# Patient Record
Sex: MALE | Race: BLACK OR AFRICAN AMERICAN | NOT HISPANIC OR LATINO | Employment: UNEMPLOYED | URBAN - METROPOLITAN AREA
[De-identification: names, ages, dates, MRNs, and addresses within clinical notes are randomized per-mention and may not be internally consistent; named-entity substitution may affect disease eponyms.]

---

## 2022-02-18 ENCOUNTER — HOSPITAL ENCOUNTER (OUTPATIENT)
Facility: HOSPITAL | Age: 67
Discharge: HOME | End: 2022-02-20
Attending: EMERGENCY MEDICINE | Admitting: HOSPITALIST
Payer: MEDICARE

## 2022-02-18 DIAGNOSIS — R60.0 EDEMA OF BOTH LOWER EXTREMITIES: ICD-10-CM

## 2022-02-18 DIAGNOSIS — R50.9 FEVER, UNKNOWN ORIGIN: ICD-10-CM

## 2022-02-18 DIAGNOSIS — L97.909 VENOUS STASIS ULCER, UNSPECIFIED SITE, UNSPECIFIED ULCER STAGE, UNSPECIFIED WHETHER VARICOSE VEINS PRESENT: ICD-10-CM

## 2022-02-18 DIAGNOSIS — I83.009 VENOUS STASIS ULCER, UNSPECIFIED SITE, UNSPECIFIED ULCER STAGE, UNSPECIFIED WHETHER VARICOSE VEINS PRESENT: ICD-10-CM

## 2022-02-18 DIAGNOSIS — R60.9 SWELLING: Primary | ICD-10-CM

## 2022-02-18 LAB
ALBUMIN SERPL BCP-MCNC: 2.7 G/DL (ref 3.5–5.2)
ALP SERPL-CCNC: 136 U/L (ref 55–135)
ALT SERPL W/O P-5'-P-CCNC: 18 U/L (ref 10–44)
ANION GAP SERPL CALC-SCNC: 6 MMOL/L (ref 8–16)
AST SERPL-CCNC: 13 U/L (ref 10–40)
BASOPHILS # BLD AUTO: 0.01 K/UL (ref 0–0.2)
BASOPHILS NFR BLD: 0.1 % (ref 0–1.9)
BILIRUB SERPL-MCNC: 0.4 MG/DL (ref 0.1–1)
BILIRUB UR QL STRIP: NEGATIVE
BNP SERPL-MCNC: 26 PG/ML (ref 0–99)
BUN SERPL-MCNC: 19 MG/DL (ref 8–23)
CALCIUM SERPL-MCNC: 8.9 MG/DL (ref 8.7–10.5)
CHLORIDE SERPL-SCNC: 107 MMOL/L (ref 95–110)
CLARITY UR: CLEAR
CO2 SERPL-SCNC: 26 MMOL/L (ref 23–29)
COLOR UR: COLORLESS
CREAT SERPL-MCNC: 1.2 MG/DL (ref 0.5–1.4)
CTP QC/QA: YES
CTP QC/QA: YES
DIFFERENTIAL METHOD: ABNORMAL
EOSINOPHIL # BLD AUTO: 0.2 K/UL (ref 0–0.5)
EOSINOPHIL NFR BLD: 1.8 % (ref 0–8)
ERYTHROCYTE [DISTWIDTH] IN BLOOD BY AUTOMATED COUNT: 12.8 % (ref 11.5–14.5)
EST. GFR  (AFRICAN AMERICAN): >60 ML/MIN/1.73 M^2
EST. GFR  (NON AFRICAN AMERICAN): >60 ML/MIN/1.73 M^2
GLUCOSE SERPL-MCNC: 190 MG/DL (ref 70–110)
GLUCOSE UR QL STRIP: NEGATIVE
HCT VFR BLD AUTO: 32.5 % (ref 40–54)
HGB BLD-MCNC: 10.4 G/DL (ref 14–18)
HGB UR QL STRIP: NEGATIVE
IMM GRANULOCYTES # BLD AUTO: 0.04 K/UL (ref 0–0.04)
IMM GRANULOCYTES NFR BLD AUTO: 0.4 % (ref 0–0.5)
KETONES UR QL STRIP: NEGATIVE
LEUKOCYTE ESTERASE UR QL STRIP: NEGATIVE
LYMPHOCYTES # BLD AUTO: 1.7 K/UL (ref 1–4.8)
LYMPHOCYTES NFR BLD: 18.5 % (ref 18–48)
MCH RBC QN AUTO: 28.2 PG (ref 27–31)
MCHC RBC AUTO-ENTMCNC: 32 G/DL (ref 32–36)
MCV RBC AUTO: 88 FL (ref 82–98)
MONOCYTES # BLD AUTO: 0.6 K/UL (ref 0.3–1)
MONOCYTES NFR BLD: 6.4 % (ref 4–15)
NEUTROPHILS # BLD AUTO: 6.9 K/UL (ref 1.8–7.7)
NEUTROPHILS NFR BLD: 72.8 % (ref 38–73)
NITRITE UR QL STRIP: NEGATIVE
NRBC BLD-RTO: 0 /100 WBC
PH UR STRIP: 7 [PH] (ref 5–8)
PLATELET # BLD AUTO: 373 K/UL (ref 150–450)
PMV BLD AUTO: 10.4 FL (ref 9.2–12.9)
POC MOLECULAR INFLUENZA A AGN: NEGATIVE
POC MOLECULAR INFLUENZA B AGN: NEGATIVE
POTASSIUM SERPL-SCNC: 4.1 MMOL/L (ref 3.5–5.1)
PROT SERPL-MCNC: 6.5 G/DL (ref 6–8.4)
PROT UR QL STRIP: NEGATIVE
RBC # BLD AUTO: 3.69 M/UL (ref 4.6–6.2)
SARS-COV-2 RDRP RESP QL NAA+PROBE: NEGATIVE
SODIUM SERPL-SCNC: 139 MMOL/L (ref 136–145)
SP GR UR STRIP: 1.01 (ref 1–1.03)
TROPONIN I SERPL DL<=0.01 NG/ML-MCNC: <0.006 NG/ML (ref 0–0.03)
TSH SERPL DL<=0.005 MIU/L-ACNC: 1.22 UIU/ML (ref 0.4–4)
URN SPEC COLLECT METH UR: ABNORMAL
UROBILINOGEN UR STRIP-ACNC: NEGATIVE EU/DL
WBC # BLD AUTO: 9.43 K/UL (ref 3.9–12.7)

## 2022-02-18 PROCEDURE — 84443 ASSAY THYROID STIM HORMONE: CPT | Performed by: EMERGENCY MEDICINE

## 2022-02-18 PROCEDURE — 81003 URINALYSIS AUTO W/O SCOPE: CPT | Performed by: EMERGENCY MEDICINE

## 2022-02-18 PROCEDURE — 85025 COMPLETE CBC W/AUTO DIFF WBC: CPT | Performed by: EMERGENCY MEDICINE

## 2022-02-18 PROCEDURE — 93010 ELECTROCARDIOGRAM REPORT: CPT | Mod: ,,, | Performed by: INTERNAL MEDICINE

## 2022-02-18 PROCEDURE — 96375 TX/PRO/DX INJ NEW DRUG ADDON: CPT

## 2022-02-18 PROCEDURE — 84484 ASSAY OF TROPONIN QUANT: CPT | Performed by: EMERGENCY MEDICINE

## 2022-02-18 PROCEDURE — 63600175 PHARM REV CODE 636 W HCPCS: Performed by: EMERGENCY MEDICINE

## 2022-02-18 PROCEDURE — 83880 ASSAY OF NATRIURETIC PEPTIDE: CPT | Performed by: EMERGENCY MEDICINE

## 2022-02-18 PROCEDURE — U0002 COVID-19 LAB TEST NON-CDC: HCPCS | Performed by: EMERGENCY MEDICINE

## 2022-02-18 PROCEDURE — 87040 BLOOD CULTURE FOR BACTERIA: CPT | Performed by: EMERGENCY MEDICINE

## 2022-02-18 PROCEDURE — 93005 ELECTROCARDIOGRAM TRACING: CPT

## 2022-02-18 PROCEDURE — 96365 THER/PROPH/DIAG IV INF INIT: CPT | Mod: 59

## 2022-02-18 PROCEDURE — 93010 EKG 12-LEAD: ICD-10-PCS | Mod: ,,, | Performed by: INTERNAL MEDICINE

## 2022-02-18 PROCEDURE — 80053 COMPREHEN METABOLIC PANEL: CPT | Performed by: EMERGENCY MEDICINE

## 2022-02-18 PROCEDURE — 99285 EMERGENCY DEPT VISIT HI MDM: CPT | Mod: 25

## 2022-02-18 RX ORDER — HYDRALAZINE HYDROCHLORIDE 20 MG/ML
10 INJECTION INTRAMUSCULAR; INTRAVENOUS
Status: COMPLETED | OUTPATIENT
Start: 2022-02-18 | End: 2022-02-18

## 2022-02-18 RX ORDER — MUPIROCIN 20 MG/G
1 OINTMENT TOPICAL
Status: COMPLETED | OUTPATIENT
Start: 2022-02-18 | End: 2022-02-19

## 2022-02-18 RX ORDER — IBUPROFEN 600 MG/1
600 TABLET ORAL
Status: COMPLETED | OUTPATIENT
Start: 2022-02-18 | End: 2022-02-19

## 2022-02-18 RX ORDER — ACETAMINOPHEN 325 MG/1
650 TABLET ORAL
Status: COMPLETED | OUTPATIENT
Start: 2022-02-18 | End: 2022-02-19

## 2022-02-18 RX ORDER — FUROSEMIDE 10 MG/ML
80 INJECTION INTRAMUSCULAR; INTRAVENOUS
Status: COMPLETED | OUTPATIENT
Start: 2022-02-18 | End: 2022-02-18

## 2022-02-18 RX ADMIN — HYDRALAZINE HYDROCHLORIDE 10 MG: 20 INJECTION, SOLUTION INTRAMUSCULAR; INTRAVENOUS at 10:02

## 2022-02-18 RX ADMIN — FUROSEMIDE 80 MG: 40 INJECTION, SOLUTION INTRAMUSCULAR; INTRAVENOUS at 09:02

## 2022-02-19 ENCOUNTER — HOSPITAL ENCOUNTER (OUTPATIENT)
Dept: RADIOLOGY | Facility: HOSPITAL | Age: 67
Discharge: HOME | End: 2022-02-19
Attending: HOSPITALIST
Payer: MEDICARE

## 2022-02-19 PROBLEM — R50.9 FEVER: Status: ACTIVE | Noted: 2022-02-19

## 2022-02-19 PROBLEM — T07.XXXA WOUNDS, MULTIPLE: Status: ACTIVE | Noted: 2022-02-19

## 2022-02-19 PROBLEM — R60.0 EDEMA OF BOTH LOWER EXTREMITIES: Status: ACTIVE | Noted: 2022-02-19

## 2022-02-19 PROBLEM — I10 HTN (HYPERTENSION): Status: ACTIVE | Noted: 2022-02-19

## 2022-02-19 PROBLEM — G93.41 ENCEPHALOPATHY, METABOLIC: Status: ACTIVE | Noted: 2022-02-19

## 2022-02-19 PROBLEM — R53.81 DEBILITY: Status: ACTIVE | Noted: 2022-02-19

## 2022-02-19 PROBLEM — D64.9 ANEMIA: Status: ACTIVE | Noted: 2022-02-19

## 2022-02-19 PROBLEM — E11.59 TYPE 2 DIABETES MELLITUS WITH CIRCULATORY DISORDER: Status: ACTIVE | Noted: 2022-02-19

## 2022-02-19 PROBLEM — Z86.73 HISTORY OF STROKE: Status: ACTIVE | Noted: 2022-02-19

## 2022-02-19 LAB
AMMONIA PLAS-SCNC: 35 UMOL/L (ref 10–50)
AMPHET+METHAMPHET UR QL: NEGATIVE
ANION GAP SERPL CALC-SCNC: 6 MMOL/L (ref 8–16)
AORTIC ROOT ANNULUS: 3.06 CM
AORTIC VALVE CUSP SEPERATION: 2.31 CM
AV INDEX (PROSTH): 0.76
AV MEAN GRADIENT: 6 MMHG
AV PEAK GRADIENT: 12 MMHG
AV VALVE AREA: 2.16 CM2
AV VELOCITY RATIO: 0.88
BARBITURATES UR QL SCN>200 NG/ML: NEGATIVE
BASOPHILS # BLD AUTO: 0.02 K/UL (ref 0–0.2)
BASOPHILS NFR BLD: 0.2 % (ref 0–1.9)
BENZODIAZ UR QL SCN>200 NG/ML: NEGATIVE
BSA FOR ECHO PROCEDURE: 1.92 M2
BUN SERPL-MCNC: 18 MG/DL (ref 8–23)
BZE UR QL SCN: NEGATIVE
CALCIUM SERPL-MCNC: 8.4 MG/DL (ref 8.7–10.5)
CANNABINOIDS UR QL SCN: NEGATIVE
CHLORIDE SERPL-SCNC: 105 MMOL/L (ref 95–110)
CHOLEST SERPL-MCNC: 90 MG/DL (ref 120–199)
CHOLEST/HDLC SERPL: 2.5 {RATIO} (ref 2–5)
CO2 SERPL-SCNC: 26 MMOL/L (ref 23–29)
CREAT SERPL-MCNC: 1.1 MG/DL (ref 0.5–1.4)
CREAT UR-MCNC: 31.9 MG/DL (ref 23–375)
CREAT UR-MCNC: 31.9 MG/DL (ref 23–375)
CV ECHO LV RWT: 0.39 CM
DIFFERENTIAL METHOD: ABNORMAL
DOP CALC AO PEAK VEL: 1.71 M/S
DOP CALC AO VTI: 37.22 CM
DOP CALC LVOT AREA: 2.8 CM2
DOP CALC LVOT DIAMETER: 1.9 CM
DOP CALC LVOT PEAK VEL: 1.5 M/S
DOP CALC LVOT STROKE VOLUME: 80.48 CM3
DOP CALCLVOT PEAK VEL VTI: 28.4 CM
E WAVE DECELERATION TIME: 282.1 MSEC
E/A RATIO: 0.88
E/E' RATIO: 10 M/S
ECHO LV POSTERIOR WALL: 1.01 CM (ref 0.6–1.1)
EJECTION FRACTION: 65 %
EOSINOPHIL # BLD AUTO: 0.1 K/UL (ref 0–0.5)
EOSINOPHIL NFR BLD: 1.2 % (ref 0–8)
ERYTHROCYTE [DISTWIDTH] IN BLOOD BY AUTOMATED COUNT: 12.7 % (ref 11.5–14.5)
EST. GFR  (AFRICAN AMERICAN): >60 ML/MIN/1.73 M^2
EST. GFR  (NON AFRICAN AMERICAN): >60 ML/MIN/1.73 M^2
ESTIMATED AVG GLUCOSE: 137 MG/DL (ref 68–131)
FERRITIN SERPL-MCNC: 181 NG/ML (ref 20–300)
FRACTIONAL SHORTENING: 39 % (ref 28–44)
GLUCOSE SERPL-MCNC: 153 MG/DL (ref 70–110)
HBA1C MFR BLD: 6.4 % (ref 4–5.6)
HCT VFR BLD AUTO: 28.2 % (ref 40–54)
HDLC SERPL-MCNC: 36 MG/DL (ref 40–75)
HDLC SERPL: 40 % (ref 20–50)
HGB BLD-MCNC: 8.9 G/DL (ref 14–18)
HIV1+2 IGG SERPL QL IA.RAPID: NORMAL
IMM GRANULOCYTES # BLD AUTO: 0.03 K/UL (ref 0–0.04)
IMM GRANULOCYTES NFR BLD AUTO: 0.3 % (ref 0–0.5)
INTERVENTRICULAR SEPTUM: 1.12 CM (ref 0.6–1.1)
IRON SERPL-MCNC: 19 UG/DL (ref 45–160)
IVRT: 87.54 MSEC
LA MAJOR: 4.21 CM
LA MINOR: 4.41 CM
LA WIDTH: 4.07 CM
LACTATE SERPL-SCNC: 1.5 MMOL/L (ref 0.5–2.2)
LDLC SERPL CALC-MCNC: 42.6 MG/DL (ref 63–159)
LEFT ATRIUM SIZE: 3.6 CM
LEFT ATRIUM VOLUME INDEX: 28.4 ML/M2
LEFT ATRIUM VOLUME: 53.65 CM3
LEFT INTERNAL DIMENSION IN SYSTOLE: 3.17 CM (ref 2.1–4)
LEFT VENTRICLE DIASTOLIC VOLUME INDEX: 69.03 ML/M2
LEFT VENTRICLE DIASTOLIC VOLUME: 130.47 ML
LEFT VENTRICLE MASS INDEX: 112 G/M2
LEFT VENTRICLE SYSTOLIC VOLUME INDEX: 21.2 ML/M2
LEFT VENTRICLE SYSTOLIC VOLUME: 40.07 ML
LEFT VENTRICULAR INTERNAL DIMENSION IN DIASTOLE: 5.22 CM (ref 3.5–6)
LEFT VENTRICULAR MASS: 212.62 G
LV LATERAL E/E' RATIO: 9.55 M/S
LV SEPTAL E/E' RATIO: 10.5 M/S
LYMPHOCYTES # BLD AUTO: 1.8 K/UL (ref 1–4.8)
LYMPHOCYTES NFR BLD: 15.9 % (ref 18–48)
MCH RBC QN AUTO: 27.9 PG (ref 27–31)
MCHC RBC AUTO-ENTMCNC: 31.6 G/DL (ref 32–36)
MCV RBC AUTO: 88 FL (ref 82–98)
METHADONE UR QL SCN>300 NG/ML: NEGATIVE
MONOCYTES # BLD AUTO: 0.8 K/UL (ref 0.3–1)
MONOCYTES NFR BLD: 7.6 % (ref 4–15)
MV PEAK A VEL: 1.2 M/S
MV PEAK E VEL: 1.05 M/S
MV STENOSIS PRESSURE HALF TIME: 59.32 MS
MV VALVE AREA P 1/2 METHOD: 3.71 CM2
NEUTROPHILS # BLD AUTO: 8.3 K/UL (ref 1.8–7.7)
NEUTROPHILS NFR BLD: 74.8 % (ref 38–73)
NONHDLC SERPL-MCNC: 54 MG/DL
NRBC BLD-RTO: 0 /100 WBC
OPIATES UR QL SCN: NEGATIVE
PCP UR QL SCN>25 NG/ML: NEGATIVE
PISA TR MAX VEL: 1.79 M/S
PLATELET # BLD AUTO: 324 K/UL (ref 150–450)
PMV BLD AUTO: 10.8 FL (ref 9.2–12.9)
POCT GLUCOSE: 142 MG/DL (ref 70–110)
POCT GLUCOSE: 163 MG/DL (ref 70–110)
POCT GLUCOSE: 85 MG/DL (ref 70–110)
POCT GLUCOSE: 98 MG/DL (ref 70–110)
POTASSIUM SERPL-SCNC: 3.5 MMOL/L (ref 3.5–5.1)
PROCALCITONIN SERPL IA-MCNC: 0.05 NG/ML
PROT UR-MCNC: <7 MG/DL
PROT/CREAT UR: NORMAL MG/G{CREAT} (ref 0–0.2)
RA MAJOR: 4 CM
RA PRESSURE: 3 MMHG
RA WIDTH: 3.39 CM
RBC # BLD AUTO: 3.19 M/UL (ref 4.6–6.2)
RIGHT VENTRICULAR END-DIASTOLIC DIMENSION: 3.33 CM
RV TISSUE DOPPLER FREE WALL SYSTOLIC VELOCITY 1 (APICAL 4 CHAMBER VIEW): 16.39 CM/S
SATURATED IRON: 9 % (ref 20–50)
SODIUM SERPL-SCNC: 137 MMOL/L (ref 136–145)
STJ: 2.34 CM
TDI LATERAL: 0.11 M/S
TDI SEPTAL: 0.1 M/S
TDI: 0.11 M/S
TOTAL IRON BINDING CAPACITY: 201 UG/DL (ref 250–450)
TOXICOLOGY INFORMATION: NORMAL
TR MAX PG: 13 MMHG
TRANSFERRIN SERPL-MCNC: 136 MG/DL (ref 200–375)
TRICUSPID ANNULAR PLANE SYSTOLIC EXCURSION: 2.48 CM
TRIGL SERPL-MCNC: 57 MG/DL (ref 30–150)
TV REST PULMONARY ARTERY PRESSURE: 16 MMHG
WBC # BLD AUTO: 11.07 K/UL (ref 3.9–12.7)

## 2022-02-19 PROCEDURE — 70450 CT HEAD/BRAIN W/O DYE: CPT | Mod: TC

## 2022-02-19 PROCEDURE — 25000003 PHARM REV CODE 250: Performed by: EMERGENCY MEDICINE

## 2022-02-19 PROCEDURE — 80307 DRUG TEST PRSMV CHEM ANLYZR: CPT | Performed by: HOSPITALIST

## 2022-02-19 PROCEDURE — 82607 VITAMIN B-12: CPT | Performed by: HOSPITALIST

## 2022-02-19 PROCEDURE — 84145 PROCALCITONIN (PCT): CPT | Performed by: HOSPITALIST

## 2022-02-19 PROCEDURE — 84156 ASSAY OF PROTEIN URINE: CPT | Performed by: HOSPITALIST

## 2022-02-19 PROCEDURE — 25000003 PHARM REV CODE 250: Performed by: HOSPITALIST

## 2022-02-19 PROCEDURE — 83036 HEMOGLOBIN GLYCOSYLATED A1C: CPT | Performed by: NURSE PRACTITIONER

## 2022-02-19 PROCEDURE — 70450 CT HEAD/BRAIN W/O DYE: CPT | Mod: 26,,, | Performed by: RADIOLOGY

## 2022-02-19 PROCEDURE — 96366 THER/PROPH/DIAG IV INF ADDON: CPT

## 2022-02-19 PROCEDURE — 63600175 PHARM REV CODE 636 W HCPCS: Performed by: HOSPITALIST

## 2022-02-19 PROCEDURE — 63600175 PHARM REV CODE 636 W HCPCS: Performed by: NURSE PRACTITIONER

## 2022-02-19 PROCEDURE — 36415 COLL VENOUS BLD VENIPUNCTURE: CPT | Performed by: EMERGENCY MEDICINE

## 2022-02-19 PROCEDURE — 80061 LIPID PANEL: CPT | Performed by: NURSE PRACTITIONER

## 2022-02-19 PROCEDURE — 63600175 PHARM REV CODE 636 W HCPCS: Performed by: EMERGENCY MEDICINE

## 2022-02-19 PROCEDURE — 80074 ACUTE HEPATITIS PANEL: CPT | Performed by: HOSPITALIST

## 2022-02-19 PROCEDURE — 85025 COMPLETE CBC W/AUTO DIFF WBC: CPT | Performed by: NURSE PRACTITIONER

## 2022-02-19 PROCEDURE — 86703 HIV-1/HIV-2 1 RESULT ANTBDY: CPT | Performed by: HOSPITALIST

## 2022-02-19 PROCEDURE — 96376 TX/PRO/DX INJ SAME DRUG ADON: CPT

## 2022-02-19 PROCEDURE — 87040 BLOOD CULTURE FOR BACTERIA: CPT | Performed by: EMERGENCY MEDICINE

## 2022-02-19 PROCEDURE — 86592 SYPHILIS TEST NON-TREP QUAL: CPT | Performed by: HOSPITALIST

## 2022-02-19 PROCEDURE — G0378 HOSPITAL OBSERVATION PER HR: HCPCS

## 2022-02-19 PROCEDURE — 82140 ASSAY OF AMMONIA: CPT | Performed by: HOSPITALIST

## 2022-02-19 PROCEDURE — 84466 ASSAY OF TRANSFERRIN: CPT | Performed by: HOSPITALIST

## 2022-02-19 PROCEDURE — 36415 COLL VENOUS BLD VENIPUNCTURE: CPT | Performed by: HOSPITALIST

## 2022-02-19 PROCEDURE — 83605 ASSAY OF LACTIC ACID: CPT | Performed by: EMERGENCY MEDICINE

## 2022-02-19 PROCEDURE — 70450 CT HEAD WITHOUT CONTRAST: ICD-10-PCS | Mod: 26,,, | Performed by: RADIOLOGY

## 2022-02-19 PROCEDURE — 82728 ASSAY OF FERRITIN: CPT | Performed by: HOSPITALIST

## 2022-02-19 PROCEDURE — 96372 THER/PROPH/DIAG INJ SC/IM: CPT | Mod: 59 | Performed by: NURSE PRACTITIONER

## 2022-02-19 PROCEDURE — 80048 BASIC METABOLIC PNL TOTAL CA: CPT | Performed by: NURSE PRACTITIONER

## 2022-02-19 PROCEDURE — 96365 THER/PROPH/DIAG IV INF INIT: CPT | Mod: 59

## 2022-02-19 PROCEDURE — 25000003 PHARM REV CODE 250: Performed by: NURSE PRACTITIONER

## 2022-02-19 PROCEDURE — 82746 ASSAY OF FOLIC ACID SERUM: CPT | Performed by: HOSPITALIST

## 2022-02-19 PROCEDURE — 82962 GLUCOSE BLOOD TEST: CPT

## 2022-02-19 RX ORDER — LISINOPRIL 20 MG/1
20 TABLET ORAL DAILY
Status: DISCONTINUED | OUTPATIENT
Start: 2022-02-19 | End: 2022-02-21 | Stop reason: HOSPADM

## 2022-02-19 RX ORDER — ENOXAPARIN SODIUM 100 MG/ML
40 INJECTION SUBCUTANEOUS EVERY 24 HOURS
Status: DISCONTINUED | OUTPATIENT
Start: 2022-02-19 | End: 2022-02-21 | Stop reason: HOSPADM

## 2022-02-19 RX ORDER — IBUPROFEN 200 MG
24 TABLET ORAL
Status: DISCONTINUED | OUTPATIENT
Start: 2022-02-19 | End: 2022-02-21 | Stop reason: HOSPADM

## 2022-02-19 RX ORDER — FUROSEMIDE 10 MG/ML
40 INJECTION INTRAMUSCULAR; INTRAVENOUS 2 TIMES DAILY WITH MEALS
Status: DISCONTINUED | OUTPATIENT
Start: 2022-02-19 | End: 2022-02-19

## 2022-02-19 RX ORDER — AMLODIPINE BESYLATE 10 MG/1
10 TABLET ORAL DAILY
Status: ON HOLD | COMMUNITY
End: 2022-02-20 | Stop reason: HOSPADM

## 2022-02-19 RX ORDER — LANOLIN ALCOHOL/MO/W.PET/CERES
1 CREAM (GRAM) TOPICAL DAILY
Status: DISCONTINUED | OUTPATIENT
Start: 2022-02-19 | End: 2022-02-21 | Stop reason: HOSPADM

## 2022-02-19 RX ORDER — LISINOPRIL 40 MG/1
40 TABLET ORAL DAILY
Status: ON HOLD | COMMUNITY
End: 2022-02-20 | Stop reason: HOSPADM

## 2022-02-19 RX ORDER — DOCUSATE SODIUM 100 MG/1
100 CAPSULE, LIQUID FILLED ORAL DAILY
Status: DISCONTINUED | OUTPATIENT
Start: 2022-02-19 | End: 2022-02-21 | Stop reason: HOSPADM

## 2022-02-19 RX ORDER — AMLODIPINE BESYLATE 5 MG/1
10 TABLET ORAL DAILY
Status: DISCONTINUED | OUTPATIENT
Start: 2022-02-19 | End: 2022-02-19

## 2022-02-19 RX ORDER — FUROSEMIDE 40 MG/1
40 TABLET ORAL 2 TIMES DAILY
COMMUNITY

## 2022-02-19 RX ORDER — METOPROLOL TARTRATE 50 MG/1
50 TABLET ORAL 2 TIMES DAILY
Status: ON HOLD | COMMUNITY
End: 2022-02-20 | Stop reason: HOSPADM

## 2022-02-19 RX ORDER — HYDROCHLOROTHIAZIDE 25 MG/1
25 TABLET ORAL DAILY
Status: ON HOLD | COMMUNITY
End: 2022-02-20 | Stop reason: HOSPADM

## 2022-02-19 RX ORDER — GLUCAGON 1 MG
1 KIT INJECTION
Status: DISCONTINUED | OUTPATIENT
Start: 2022-02-19 | End: 2022-02-21 | Stop reason: HOSPADM

## 2022-02-19 RX ORDER — INSULIN ASPART 100 [IU]/ML
0-5 INJECTION, SOLUTION INTRAVENOUS; SUBCUTANEOUS
Status: DISCONTINUED | OUTPATIENT
Start: 2022-02-19 | End: 2022-02-21 | Stop reason: HOSPADM

## 2022-02-19 RX ORDER — HYDRALAZINE HYDROCHLORIDE 20 MG/ML
10 INJECTION INTRAMUSCULAR; INTRAVENOUS EVERY 6 HOURS PRN
Status: DISCONTINUED | OUTPATIENT
Start: 2022-02-19 | End: 2022-02-21 | Stop reason: HOSPADM

## 2022-02-19 RX ORDER — ACETAMINOPHEN 325 MG/1
650 TABLET ORAL EVERY 6 HOURS PRN
Status: DISCONTINUED | OUTPATIENT
Start: 2022-02-19 | End: 2022-02-21 | Stop reason: HOSPADM

## 2022-02-19 RX ORDER — POTASSIUM CHLORIDE 20 MEQ/1
20 TABLET, EXTENDED RELEASE ORAL ONCE
COMMUNITY

## 2022-02-19 RX ORDER — METOPROLOL TARTRATE 25 MG/1
25 TABLET, FILM COATED ORAL 2 TIMES DAILY
Status: DISCONTINUED | OUTPATIENT
Start: 2022-02-19 | End: 2022-02-21 | Stop reason: HOSPADM

## 2022-02-19 RX ORDER — IBUPROFEN 200 MG
16 TABLET ORAL
Status: DISCONTINUED | OUTPATIENT
Start: 2022-02-19 | End: 2022-02-21 | Stop reason: HOSPADM

## 2022-02-19 RX ORDER — FUROSEMIDE 10 MG/ML
40 INJECTION INTRAMUSCULAR; INTRAVENOUS
Status: DISCONTINUED | OUTPATIENT
Start: 2022-02-19 | End: 2022-02-21 | Stop reason: HOSPADM

## 2022-02-19 RX ADMIN — IBUPROFEN 600 MG: 600 TABLET, FILM COATED ORAL at 12:02

## 2022-02-19 RX ADMIN — METOPROLOL TARTRATE 25 MG: 25 TABLET, FILM COATED ORAL at 08:02

## 2022-02-19 RX ADMIN — PIPERACILLIN AND TAZOBACTAM 4.5 G: 4; .5 INJECTION, POWDER, LYOPHILIZED, FOR SOLUTION INTRAVENOUS; PARENTERAL at 12:02

## 2022-02-19 RX ADMIN — PIPERACILLIN AND TAZOBACTAM 4.5 G: 4; .5 INJECTION, POWDER, LYOPHILIZED, FOR SOLUTION INTRAVENOUS; PARENTERAL at 05:02

## 2022-02-19 RX ADMIN — FUROSEMIDE 40 MG: 10 INJECTION, SOLUTION INTRAMUSCULAR; INTRAVENOUS at 07:02

## 2022-02-19 RX ADMIN — FUROSEMIDE 40 MG: 10 INJECTION, SOLUTION INTRAMUSCULAR; INTRAVENOUS at 04:02

## 2022-02-19 RX ADMIN — LISINOPRIL 20 MG: 20 TABLET ORAL at 11:02

## 2022-02-19 RX ADMIN — ACETAMINOPHEN 650 MG: 325 TABLET ORAL at 12:02

## 2022-02-19 RX ADMIN — VANCOMYCIN HYDROCHLORIDE 2000 MG: 10 INJECTION, POWDER, LYOPHILIZED, FOR SOLUTION INTRAVENOUS at 12:02

## 2022-02-19 RX ADMIN — MUPIROCIN 22 G: 20 OINTMENT TOPICAL at 02:02

## 2022-02-19 RX ADMIN — PIPERACILLIN AND TAZOBACTAM 4.5 G: 4; .5 INJECTION, POWDER, LYOPHILIZED, FOR SOLUTION INTRAVENOUS; PARENTERAL at 11:02

## 2022-02-19 RX ADMIN — VANCOMYCIN HYDROCHLORIDE 2250 MG: 1 INJECTION, POWDER, LYOPHILIZED, FOR SOLUTION INTRAVENOUS at 11:02

## 2022-02-19 RX ADMIN — FERROUS SULFATE TAB 325 MG (65 MG ELEMENTAL FE) 1 EACH: 325 (65 FE) TAB at 11:02

## 2022-02-19 RX ADMIN — DOCUSATE SODIUM 100 MG: 100 CAPSULE ORAL at 11:02

## 2022-02-19 RX ADMIN — ENOXAPARIN SODIUM 40 MG: 100 INJECTION SUBCUTANEOUS at 05:02

## 2022-02-19 NOTE — ED TRIAGE NOTES
"Pt a&ox3, calm and cooperative, brought in ems with niece. Presents to ED with multiple stages ulcers on b/l legs and feet with lower peripheral edema. Pt and niece are poor historian. As per both pt and niece, pt wife has been ill and is unable to care for pt. Pt niece states that pt was taken to hospital for current condition in [place] however, due to pt insurance policy the state did not attend to patients needs. When ask where and who is currently taking care of pt, response was "[place]". Pt denies fever/chills, chest pain, sob. Respirations tachypnic, unlabored, mild rigors noted, sinus on cardiac monitor.    "

## 2022-02-19 NOTE — H&P
"[facility] Medicine  History & Physical    Patient Name: Philomena Richards  MRN: [id number]  Patient Class: OP- Observation  Admission Date: 2/18/2022  Attending Physician: Medhat Arreguin MD   Primary Care Provider: No primary care provider on file.         Patient information was obtained from patient, relative(s) and ER records.     Subjective:     Principal Problem:Wounds, multiple    Chief Complaint:   Chief Complaint   Patient presents with    Sores     Pt brought in by family stating that pt has been in a bad living situation and he needs medical care; pt has multiple sores/wounds to bilateral lower legs & family reports hx of DM; pt tachyepnic, has audible wet breath sounds but SpO2 is 100% RA and denies SOB; pt states he "feels fine" and denies any pain; pt in wheelchair but reports he is able to ambulate        HPI: 66 year old male brought to the ED due to sores on bilateral legs. Patient has no medical history per family. Family brings patient in stating that he stays with their family member in [facility] however because he has [place] insurance he had to come back here to get medical care.  Per patient's family member he has had sores on his legs for unknown period of time, and presents here for evaluation.          No past medical history on file.    No past surgical history on file.    Review of patient's allergies indicates:  No Known Allergies    No current facility-administered medications on file prior to encounter.     No current outpatient medications on file prior to encounter.     Family History    None       Tobacco Use    Smoking status: Not on file    Smokeless tobacco: Not on file   Substance and Sexual Activity    Alcohol use: Not on file    Drug use: Not on file    Sexual activity: Not on file     Review of Systems   Constitutional: Negative for fever.   HENT: Negative for sore throat.    Respiratory: Negative for shortness of breath.    Cardiovascular: " Negative for chest pain.   Gastrointestinal: Negative for nausea.   Genitourinary: Negative for dysuria.   Musculoskeletal: Negative for back pain.   Skin: Positive for wound. Negative for rash.   Neurological: Negative for weakness.   Hematological: Does not bruise/bleed easily.   All other systems reviewed and are negative.  Objective:     Vital Signs (Most Recent):  Temp: (!) 100.8 °F (38.2 °C) (02/18/22 2324)  Pulse: 96 (02/18/22 2302)  Resp: (!) 24 (02/18/22 2103)  BP: (!) 186/74 (02/18/22 2302)  SpO2: 99 % (02/18/22 2302) Vital Signs (24h Range):  Temp:  [97.7 °F (36.5 °C)-100.8 °F (38.2 °C)] 100.8 °F (38.2 °C)  Pulse:  [90-96] 96  Resp:  [24] 24  SpO2:  [99 %-100 %] 99 %  BP: (166-209)/() 186/74     Weight: 86.2 kg (190 lb)  Body mass index is 31.62 kg/m².    Physical Exam   Nursing note and vitals reviewed.  Constitutional: He appears well-developed and well-nourished.   HENT:   Head: Normocephalic and atraumatic.   Eyes: EOM are normal. Pupils are equal, round, and reactive to light.   Cardiovascular: Normal rate, regular rhythm and normal heart sounds.   Pulmonary/Chest: Effort normal and breath sounds normal. No respiratory distress.   Abdominal: He exhibits no distension.   Musculoskeletal:         General: No tenderness or edema.     Neurological: He is alert and oriented to person, place, and time.   Skin: Skin is warm and dry.   Psychiatric: He has a normal mood and affect.      4+ b/l LE pitting edema with innumerable venous stasis wounds which have blistered and popped.   None appear infected.    Significant Labs: All pertinent labs within the past 24 hours have been reviewed.    Significant Imaging: I have reviewed all pertinent imaging results/findings within the past 24 hours.    Assessment/Plan:     * Wounds, multiple  Consult wound care  Started on vancomycin and zosyn       HTN (hypertension)  Started patient on lisinopril 20 mg po daily    hydralazine prn      Anemia  H/H stable at  10.4/32.5  Started on iron supplement      Edema of both lower extremities  US of bilateral lower extremities   Echo in am  BNP 26  IV lasix ordered   Strict I&Os   Daily weight        Fever  Likely source of infection bilateral leg wound  Collect Blood cultures and lactic acid   Tylenol prn  Patient started on IV abx           VTE Risk Mitigation (From admission, onward)         Ordered     enoxaparin injection 40 mg  Daily         02/19/22 0229              As clarification, on 2/19/22 @0030, patient should be placed in hospital observation services under my care in collaboration with MD Rl Acosta, NP  Department of Hospital Medicine   [facility] - Emergency Dept

## 2022-02-19 NOTE — PLAN OF CARE
[facility] - Telemetry  Discharge Assessment    Primary Care Provider: Primary Doctor No     Discharge Assessment (most recent)       BRIEF DISCHARGE ASSESSMENT - 02/19/22 1053          Discharge Planning    Assessment Type Discharge Planning Brief Assessment     Resource/Environmental Concerns none     Support Systems Children;Family members     Equipment Currently Used at Home cane, straight     Current Living Arrangements home/apartment/condo     Patient/Family Anticipates Transition to home with family     Patient/Family Anticipated Services at Transition none     Discharge Plan A Home with family     Discharge Plan B Home with family                   SW spoke with patient's daughter to complete brief assessment. Patient's daughter stated patient lives with family. He uses a cane at home, and he does not have HH or receive dialysis. Patient's daughter stated family will provide transportation at discharge.

## 2022-02-19 NOTE — SUBJECTIVE & OBJECTIVE
No past medical history on file.    No past surgical history on file.    Review of patient's allergies indicates:  No Known Allergies    No current facility-administered medications on file prior to encounter.     No current outpatient medications on file prior to encounter.     Family History    None       Tobacco Use    Smoking status: Not on file    Smokeless tobacco: Not on file   Substance and Sexual Activity    Alcohol use: Not on file    Drug use: Not on file    Sexual activity: Not on file     Review of Systems   Constitutional: Negative for fever.   HENT: Negative for sore throat.    Respiratory: Negative for shortness of breath.    Cardiovascular: Negative for chest pain.   Gastrointestinal: Negative for nausea.   Genitourinary: Negative for dysuria.   Musculoskeletal: Negative for back pain.   Skin: Positive for wound. Negative for rash.   Neurological: Negative for weakness.   Hematological: Does not bruise/bleed easily.   All other systems reviewed and are negative.  Objective:     Vital Signs (Most Recent):  Temp: (!) 100.8 °F (38.2 °C) (02/18/22 2324)  Pulse: 96 (02/18/22 2302)  Resp: (!) 24 (02/18/22 2103)  BP: (!) 186/74 (02/18/22 2302)  SpO2: 99 % (02/18/22 2302) Vital Signs (24h Range):  Temp:  [97.7 °F (36.5 °C)-100.8 °F (38.2 °C)] 100.8 °F (38.2 °C)  Pulse:  [90-96] 96  Resp:  [24] 24  SpO2:  [99 %-100 %] 99 %  BP: (166-209)/() 186/74     Weight: 86.2 kg (190 lb)  Body mass index is 31.62 kg/m².    Physical Exam   Nursing note and vitals reviewed.  Constitutional: He appears well-developed and well-nourished.   HENT:   Head: Normocephalic and atraumatic.   Eyes: EOM are normal. Pupils are equal, round, and reactive to light.   Cardiovascular: Normal rate, regular rhythm and normal heart sounds.   Pulmonary/Chest: Effort normal and breath sounds normal. No respiratory distress.   Abdominal: He exhibits no distension.   Musculoskeletal:         General: No tenderness or edema.      Neurological: He is alert and oriented to person, place, and time.   Skin: Skin is warm and dry.   Psychiatric: He has a normal mood and affect.      4+ b/l LE pitting edema with innumerable venous stasis wounds which have blistered and popped.   None appear infected.    Significant Labs: All pertinent labs within the past 24 hours have been reviewed.    Significant Imaging: I have reviewed all pertinent imaging results/findings within the past 24 hours.

## 2022-02-19 NOTE — NURSING
Arrived to Unit by transport. AAO X 3, disoriented to situation. Telemetry monitor put in place and VS assessed, all WNL. Blood sugar 163. Wounds to bilatreal lower ext assessed and dressed per order. Off loading boots in place and foam protecting dressing applied to sacral area. Side rail up X 3 and bed alarm in place. Urinal and call light at side.

## 2022-02-19 NOTE — PROGRESS NOTES
[facility] Medicine  Progress Note    Patient Name: Philomena Richards  MRN: [id number]  Patient Class: OP- Observation   Admission Date: 2/18/2022  Length of Stay: 0 days  Attending Physician: Medhat Arreguin MD  Primary Care Provider: Primary Doctor No        Subjective:     Principal Problem:Wounds, multiple        HPI:  66 year old male brought to the ED due to sores on bilateral legs. Patient has no medical history per family. Family brings patient in stating that he stays with their family member in [facility] however because he has [place] insurance he had to come back here to get medical care.  Per patient's family member he has had sores on his legs for unknown period of time, and presents here for evaluation.          Overview/Hospital Course:  No notes on file    Interval History: No acute events overnight.  Patient minimally verbal.  Denied pain and sob but could not provide any other history.  Spoke with his niece Shahida who provided history.  She states he had a stroke around June 2021 in [place], [place].  She states this is his normal mental status.  States that he walks with a cane.  They brought him in for evaluation of leg edema and wounds.  These have been going on for about a month.      PMH: hypertension, stroke, diabetes (on detemir 15 units daily)    Home meds include:  Norvasc 10 qd, lasix 40 qd, lisinopril 40 qd, metoprolol tartrate 50 bid, and KCL 20meq qd    Review of Systems   Unable to perform ROS: Mental status change   Objective:     Vital Signs (Most Recent):  Temp: 99 °F (37.2 °C) (02/19/22 0947)  Pulse: 84 (02/19/22 0947)  Resp: 20 (02/19/22 0947)  BP: (!) 168/78 (02/19/22 0947)  SpO2: 98 % (02/19/22 0947)   Vital Signs (24h Range):  Temp:  [97.7 °F (36.5 °C)-100.8 °F (38.2 °C)] 99 °F (37.2 °C)  Pulse:  [75-96] 84  Resp:  [18-24] 20  SpO2:  [98 %-100 %] 98 %  BP: (133-209)/() 168/78     Weight: 77.7 kg (171 lb 4.8 oz)  Body mass index is 26.83  kg/m².    Intake/Output Summary (Last 24 hours) at 2/19/2022 1337  Last data filed at 2/19/2022 0800  Gross per 24 hour   Intake 120 ml   Output --   Net 120 ml      Physical Exam  Vitals and nursing note reviewed.   Constitutional:       General: He is not in acute distress.     Appearance: He is well-developed. He is ill-appearing. He is not toxic-appearing or diaphoretic.   HENT:      Head: Normocephalic and atraumatic.      Right Ear: External ear normal.      Left Ear: External ear normal.      Nose: Nose normal.      Mouth/Throat:      Mouth: Mucous membranes are moist.   Eyes:      Extraocular Movements: Extraocular movements intact.      Conjunctiva/sclera: Conjunctivae normal.   Cardiovascular:      Rate and Rhythm: Normal rate and regular rhythm.      Heart sounds: Normal heart sounds.   Pulmonary:      Effort: Pulmonary effort is normal. No respiratory distress.      Breath sounds: Normal breath sounds.   Abdominal:      General: Bowel sounds are normal. There is no distension.      Palpations: Abdomen is soft.      Tenderness: There is no abdominal tenderness.   Musculoskeletal:         General: Normal range of motion.      Cervical back: Normal range of motion and neck supple.   Skin:     General: Skin is warm and dry.      Comments: Legs with 1-2+ edema to shins.  Multiple small wounds -some are purulent looking.   Neurological:      Cranial Nerves: No cranial nerve deficit.      Coordination: Coordination normal.      Comments: Oriented to self only.  Somnolent but arousable with significant stimulation.  Answers only a few questions.  Diffusely weak.  Does not follow commands.  Family state this is baseline and that he can walk.       Significant Labs: All pertinent labs within the past 24 hours have been reviewed.    Significant Imaging: I have reviewed all pertinent imaging results/findings within the past 24 hours.      Assessment/Plan:      * Wounds, multiple  -Placed in observation  -Noted  multiple wounds on legs which niece states have developed over last 4-5 weeks  -Had fever but no leukocytosis.  -Blood cultures obtained in ER and negative so far  -Check procalcitonin, lactic acid and wound culture  -Consult wound care if he's still in house Monday  -CXR clear.  UA clear    Edema of both lower extremities  -Has developed edema in legs over last 4-5 weeks per niece  -BNP only 26 on admit.  Albumin only 2.7  -Doppler US of legs negative for DVT  -Echo showed EF 65%, normal diastolic function and normal pulmonary artery pressure  -Check hepatitis panel, urine protein/Creatinine ratio and arterial us of legs with TRICIA.  -Hold home norvasc.  -Continue lasix for now.    Fever  -Treatment as above.    Encephalopathy, metabolic  -Family states he is at baseline, but what they describe as baseline is much better than his present condition.  -Barely alert and minimally verbal.  -Could be deteriorated due to infected wounds in legs?  -TSH, UA and CXR normal.  -Check head CT  -Search for other reversible encephalopathies:  RPR, B12, Folate, Ammonia.  -Order neuro-checks and delirium precautions.    History of stroke  -Noted 2021 - request records from [facility]  -Family states he is at baseline - but what they describe as baseline is much better than his present condition.  -LDL 42 and TC 90  -Start aspirin  -Consult PT/OT/SLP    Debility  -Noted stroke in 2021   -Family states he is at baseline - but what they describe as baseline is much better than his present condition.  -Consult PT/OT.    Type 2 diabetes mellitus with circulatory disorder  -Check A1c  -Niece states he takes detemir 15 units qhs  -For now treat with SSI ac/hs    HTN (hypertension)  -At home takes norvasc, metoprolol and lisinopril per family  -Will resume these metoprolol and lisinopril    Anemia  -On admit Hb 10.4.  No old labs to compare  -Today Hb down to 8.9  -Check iron, ferritin, b12 and folate.  -Repeat cbc in  AM      VTE Risk Mitigation (From admission, onward)         Ordered     enoxaparin injection 40 mg  Daily         02/19/22 0229                Discharge Planning   ELIZABETH:      Code Status: Full Code   Is the patient medically ready for discharge?:     Reason for patient still in hospital (select all that apply): Treatment  Discharge Plan A: Home with family                  Medhat Arreguin MD  Department of [facility] Medicine   [facility]

## 2022-02-19 NOTE — ASSESSMENT & PLAN NOTE
-Has developed edema in legs over last 4-5 weeks per niece  -BNP only 26 on admit.  Albumin only 2.7  -Doppler US of legs negative for DVT  -Echo showed EF 65%, normal diastolic function and normal pulmonary artery pressure  -Check hepatitis panel, urine protein/Creatinine ratio and arterial us of legs with TRICIA.  -Hold home norvasc.  -Continue lasix for now.

## 2022-02-19 NOTE — ASSESSMENT & PLAN NOTE
Likely source of infection bilateral leg wound  Collect Blood cultures and lactic acid   Tylenol prn  Patient started on IV abx

## 2022-02-19 NOTE — HPI
66 year old male brought to the ED due to sores on bilateral legs. Patient has no medical history per family. Family brings patient in stating that he stays with their family member in [facility] however because he has [place] insurance he had to come back here to get medical care.  Per patient's family member he has had sores on his legs for unknown period of time, and presents here for evaluation.

## 2022-02-19 NOTE — ASSESSMENT & PLAN NOTE
-Family states he is at baseline, but what they describe as baseline is much better than his present condition.  -Barely alert and minimally verbal.  -Could be deteriorated due to infected wounds in legs?  -TSH, UA and CXR normal.  -Check head CT  -Search for other reversible encephalopathies:  RPR, B12, Folate, Ammonia.  -Order neuro-checks and delirium precautions.

## 2022-02-19 NOTE — ASSESSMENT & PLAN NOTE
-On admit Hb 10.4.  No old labs to compare  -Today Hb down to 8.9  -Check iron, ferritin, b12 and folate.  -Repeat cbc in AM

## 2022-02-19 NOTE — SUBJECTIVE & OBJECTIVE
Interval History: No acute events overnight.  Patient minimally verbal.  Denied pain and sob but could not provide any other history.  Spoke with his niece Shahida who provided history.  She states he had a stroke around June 2021 in [place], [place].  She states this is his normal mental status.  States that he walks with a cane.  They brought him in for evaluation of leg edema and wounds.  These have been going on for about a month.      PMH: hypertension, stroke, diabetes (on detemir 15 units daily)    Home meds include:  Norvasc 10 qd, lasix 40 qd, lisinopril 40 qd, metoprolol tartrate 50 bid, and KCL 20meq qd    Review of Systems   Unable to perform ROS: Mental status change   Objective:     Vital Signs (Most Recent):  Temp: 99 °F (37.2 °C) (02/19/22 0947)  Pulse: 84 (02/19/22 0947)  Resp: 20 (02/19/22 0947)  BP: (!) 168/78 (02/19/22 0947)  SpO2: 98 % (02/19/22 0947)   Vital Signs (24h Range):  Temp:  [97.7 °F (36.5 °C)-100.8 °F (38.2 °C)] 99 °F (37.2 °C)  Pulse:  [75-96] 84  Resp:  [18-24] 20  SpO2:  [98 %-100 %] 98 %  BP: (133-209)/() 168/78     Weight: 77.7 kg (171 lb 4.8 oz)  Body mass index is 26.83 kg/m².    Intake/Output Summary (Last 24 hours) at 2/19/2022 1337  Last data filed at 2/19/2022 0800  Gross per 24 hour   Intake 120 ml   Output --   Net 120 ml      Physical Exam  Vitals and nursing note reviewed.   Constitutional:       General: He is not in acute distress.     Appearance: He is well-developed. He is ill-appearing. He is not toxic-appearing or diaphoretic.   HENT:      Head: Normocephalic and atraumatic.      Right Ear: External ear normal.      Left Ear: External ear normal.      Nose: Nose normal.      Mouth/Throat:      Mouth: Mucous membranes are moist.   Eyes:      Extraocular Movements: Extraocular movements intact.      Conjunctiva/sclera: Conjunctivae normal.   Cardiovascular:      Rate and Rhythm: Normal rate and regular rhythm.      Heart sounds: Normal heart sounds.   Pulmonary:       Effort: Pulmonary effort is normal. No respiratory distress.      Breath sounds: Normal breath sounds.   Abdominal:      General: Bowel sounds are normal. There is no distension.      Palpations: Abdomen is soft.      Tenderness: There is no abdominal tenderness.   Musculoskeletal:         General: Normal range of motion.      Cervical back: Normal range of motion and neck supple.   Skin:     General: Skin is warm and dry.      Comments: Legs with 1-2+ edema to shins.  Multiple small wounds -some are purulent looking.   Neurological:      Cranial Nerves: No cranial nerve deficit.      Coordination: Coordination normal.      Comments: Oriented to self only.  Somnolent but arousable with significant stimulation.  Answers only a few questions.  Diffusely weak.  Does not follow commands.  Family state this is baseline and that he can walk.       Significant Labs: All pertinent labs within the past 24 hours have been reviewed.    Significant Imaging: I have reviewed all pertinent imaging results/findings within the past 24 hours.

## 2022-02-19 NOTE — ASSESSMENT & PLAN NOTE
-Noted 2021 - request records from [facility]  -Family states he is at baseline - but what they describe as baseline is much better than his present condition.  -LDL 42 and TC 90  -Start aspirin  -Consult PT/OT/SLP

## 2022-02-19 NOTE — PROGRESS NOTES
Pharmacokinetic Initial Assessment: IV Vancomycin    Assessment/Plan:    Initiate intravenous vancomycin with loading dose of 2000 mg once followed by a maintenance dose of vancomycin 2250 mg IV every 24 hours  Desired empiric serum trough concentration is 10 to 20 mcg/mL  Draw vancomycin trough level 60 min prior to third dose on 2/20/22 at approximately 2330  Pharmacy will continue to follow and monitor vancomycin.      Please contact pharmacy at extension [phone] with any questions regarding this assessment.     Thank you for the consult,   Sohail Avalos       Patient brief summary:  Philomena Richards is a 66 y.o. male initiated on antimicrobial therapy with IV Vancomycin for treatment of suspected  SIRS    Drug Allergies:   Review of patient's allergies indicates:  No Known Allergies    Actual Body Weight:   86.2 kg    Renal Function:   Estimated Creatinine Clearance: 61.2 mL/min (based on SCr of 1.2 mg/dL).,     Dialysis Method (if applicable):  N/A    CBC (last 72 hours):  Recent Labs   Lab Result Units 02/18/22  2201   WBC K/uL 9.43   Hemoglobin g/dL 10.4*   Hematocrit % 32.5*   Platelets K/uL 373   Gran % % 72.8   Lymph % % 18.5   Mono % % 6.4   Eosinophil % % 1.8   Basophil % % 0.1   Differential Method  Automated       Metabolic Panel (last 72 hours):  Recent Labs   Lab Result Units 02/18/22 2201 02/18/22  2324   Sodium mmol/L 139  --    Potassium mmol/L 4.1  --    Chloride mmol/L 107  --    CO2 mmol/L 26  --    Glucose mg/dL 190*  --    Glucose, UA   --  Negative   BUN mg/dL 19  --    Creatinine mg/dL 1.2  --    Albumin g/dL 2.7*  --    Total Bilirubin mg/dL 0.4  --    Alkaline Phosphatase U/L 136*  --    AST U/L 13  --    ALT U/L 18  --        Drug levels (last 3 results):  No results for input(s): VANCOMYCINRA, VANCOMYCINPE, VANCOMYCINTR in the last 72 hours.    Microbiologic Results:  Microbiology Results (last 7 days)       Procedure Component Value Units Date/Time    Blood Culture #2 **CANNOT BE  ORDERED STAT** [[id number]] Collected: 02/18/22 2339    Order Status: Sent Specimen: Blood from Peripheral, Lower Arm, Right Updated: 02/19/22 0006    Blood Culture #1 **CANNOT BE ORDERED STAT** [[id number]] Collected: 02/19/22 0000    Order Status: Sent Specimen: Blood from Peripheral, Forearm, Left Updated: 02/19/22 0005

## 2022-02-19 NOTE — ASSESSMENT & PLAN NOTE
-Noted stroke in 2021   -Family states he is at baseline - but what they describe as baseline is much better than his present condition.  -Consult PT/OT.

## 2022-02-19 NOTE — ASSESSMENT & PLAN NOTE
US of bilateral lower extremities   Echo in am  BNP 26  IV lasix ordered   Strict I&Os   Daily weight

## 2022-02-19 NOTE — ED PROVIDER NOTES
"Encounter Date: 2/18/2022       History     Chief Complaint   Patient presents with    Sores     Pt brought in by family stating that pt has been in a bad living situation and he needs medical care; pt has multiple sores/wounds to bilateral lower legs & family reports hx of DM; pt tachyepnic, has audible wet breath sounds but SpO2 is 100% RA and denies SOB; pt states he "feels fine" and denies any pain; pt in wheelchair but reports he is able to ambulate     66 y.o. male No past medical history on file.     Family brings patient in stating that he stays with their family member in [facility] however because he has [place] insurance he had to come back here to get medical care.  Per patient's family member he has had sores on his legs for unknown period of time, and presents here for evaluation        Review of patient's allergies indicates:  No Known Allergies  No past medical history on file.  No past surgical history on file.  No family history on file.     Review of Systems   Constitutional: Negative for fever.   HENT: Negative for sore throat.    Respiratory: Negative for shortness of breath.    Cardiovascular: Negative for chest pain.   Gastrointestinal: Negative for nausea.   Genitourinary: Negative for dysuria.   Musculoskeletal: Negative for back pain.   Skin: Positive for wound. Negative for rash.   Neurological: Negative for weakness.   Hematological: Does not bruise/bleed easily.   All other systems reviewed and are negative.      Physical Exam     Initial Vitals [02/18/22 2103]   BP Pulse Resp Temp SpO2   (!) 166/105 90 (!) 24 97.7 °F (36.5 °C) 100 %      MAP       --         Physical Exam    Nursing note and vitals reviewed.  Constitutional: He appears well-developed and well-nourished.   HENT:   Head: Normocephalic and atraumatic.   Eyes: EOM are normal. Pupils are equal, round, and reactive to light.   Cardiovascular: Normal rate, regular rhythm and normal heart sounds.   Pulmonary/Chest: Effort " normal and breath sounds normal. No respiratory distress.   Abdominal: He exhibits no distension.   Musculoskeletal:         General: No tenderness or edema.     Neurological: He is alert and oriented to person, place, and time.   Skin: Skin is warm and dry.   Psychiatric: He has a normal mood and affect.     4+ b/l LE pitting edema with innumerable venous stasis wounds which have blistered and popped.   None appear infected.  ED Course   Procedures  Labs Reviewed   CBC W/ AUTO DIFFERENTIAL - Abnormal; Notable for the following components:       Result Value    RBC 3.69 (*)     Hemoglobin 10.4 (*)     Hematocrit 32.5 (*)     All other components within normal limits   COMPREHENSIVE METABOLIC PANEL - Abnormal; Notable for the following components:    Glucose 190 (*)     Albumin 2.7 (*)     Alkaline Phosphatase 136 (*)     Anion Gap 6 (*)     All other components within normal limits   URINALYSIS, REFLEX TO URINE CULTURE - Abnormal; Notable for the following components:    Color, UA Colorless (*)     All other components within normal limits    Narrative:     Specimen Source->Urine   CULTURE, BLOOD   CULTURE, BLOOD   B-TYPE NATRIURETIC PEPTIDE   TROPONIN I   TSH   SARS-COV-2 RDRP GENE   POCT INFLUENZA A/B MOLECULAR     EKG Readings: (Independently Interpreted)   ekg interpretation limited by pt with tremor. Hr 92 sinus, rbbb, R axis, nl intervals, no stemi.        Imaging Results          X-Ray Chest AP Portable (Final result)  Result time 02/18/22 22:19:15    Final result by Jeannine Sosa MD (02/18/22 22:19:15)                 Impression:      No acute intrathoracic abnormality detected.      Electronically signed by: Jeannine Sosa  Date:    02/18/2022  Time:    22:19             Narrative:    EXAMINATION:  AP PORTABLE CHEST    CLINICAL HISTORY:  Edema, unspecified    TECHNIQUE:  AP portable chest radiograph was submitted.    COMPARISON:  None.    FINDINGS:  AP portable chest radiograph demonstrates a cardiac  silhouette within normal limits.  There is no focal consolidation, pneumothorax, or pleural effusion.  There is cervical hardware.  Spondylitic changes are present.  There are overlying cardiac leads.  Tiny metallic clips are seen in the right axillary region.                                 Medications   mupirocin 2 % ointment 22 g (has no administration in time range)   vancomycin - pharmacy to dose (has no administration in time range)   vancomycin (VANCOCIN) 2,250 mg in dextrose 5 % 500 mL IVPB (2,250 mg Intravenous Trough Due As Scheduled Before Dose 2/20/22 2330)   piperacillin-tazobactam 4.5 g in dextrose 5 % 100 mL IVPB (ready to mix system) (has no administration in time range)   acetaminophen tablet 650 mg (has no administration in time range)   furosemide injection 40 mg (has no administration in time range)   amLODIPine tablet 10 mg (has no administration in time range)   ferrous sulfate tablet 1 each (has no administration in time range)   docusate sodium capsule 100 mg (has no administration in time range)   hydrALAZINE injection 10 mg (has no administration in time range)   enoxaparin injection 40 mg (has no administration in time range)   furosemide injection 80 mg (80 mg Intravenous Given 2/18/22 2147)   hydrALAZINE injection 10 mg (10 mg Intravenous Given 2/18/22 2223)   piperacillin-tazobactam 4.5 g in dextrose 5 % 100 mL IVPB (ready to mix system) (0 g Intravenous Stopped 2/19/22 0106)   acetaminophen tablet 650 mg (650 mg Oral Given 2/19/22 0019)   ibuprofen tablet 600 mg (600 mg Oral Given 2/19/22 0019)   vancomycin (VANCOCIN) 2,000 mg in dextrose 5 % 500 mL IVPB (0 mg Intravenous Stopped 2/19/22 0235)                   I discussed with family member the patient will have to follow-up with wound care clinic.  It is unclear what at this time he has 4+ lower extremity edema will check urine renal function liver function cardiac enzymes and BNP.  Blood pressure noted to be slightly increased  patient would benefit from a diuretic I have written him for 80 mg      Patient's BP remains elevated have written for 10 mg IV hydralazine.    Patient now has low-grade fever have initiated septic workup will not give 30 mL/kilogram boluses he is fluid overloaded will give him vanc and Zosyn and panculture  Clinical Imp IV Lasix now   ression:   Final diagnoses:  [R60.9] Swelling (Primary)  [I83.009, L97.909] Venous stasis ulcer, unspecified site, unspecified ulcer stage, unspecified whether varicose veins present  [R50.9] Fever, unknown origin          ED Disposition Condition    Observation               Norma Cabezas MD  02/19/22 3042

## 2022-02-19 NOTE — ASSESSMENT & PLAN NOTE
-Placed in observation  -Noted multiple wounds on legs which niece states have developed over last 4-5 weeks  -Had fever but no leukocytosis.  -Blood cultures obtained in ER and negative so far  -Check procalcitonin, lactic acid and wound culture  -Consult wound care if he's still in house Monday  -CXR clear.  UA clear

## 2022-02-20 VITALS
WEIGHT: 171.31 LBS | HEIGHT: 67 IN | OXYGEN SATURATION: 95 % | TEMPERATURE: 97 F | RESPIRATION RATE: 18 BRPM | HEART RATE: 71 BPM | DIASTOLIC BLOOD PRESSURE: 61 MMHG | SYSTOLIC BLOOD PRESSURE: 106 MMHG | BODY MASS INDEX: 26.89 KG/M2

## 2022-02-20 LAB
ANION GAP SERPL CALC-SCNC: 9 MMOL/L (ref 8–16)
BASOPHILS # BLD AUTO: 0.03 K/UL (ref 0–0.2)
BASOPHILS NFR BLD: 0.3 % (ref 0–1.9)
BUN SERPL-MCNC: 15 MG/DL (ref 8–23)
CALCIUM SERPL-MCNC: 8.2 MG/DL (ref 8.7–10.5)
CHLORIDE SERPL-SCNC: 103 MMOL/L (ref 95–110)
CO2 SERPL-SCNC: 28 MMOL/L (ref 23–29)
CREAT SERPL-MCNC: 1.3 MG/DL (ref 0.5–1.4)
DIFFERENTIAL METHOD: ABNORMAL
EOSINOPHIL # BLD AUTO: 0.2 K/UL (ref 0–0.5)
EOSINOPHIL NFR BLD: 2.3 % (ref 0–8)
ERYTHROCYTE [DISTWIDTH] IN BLOOD BY AUTOMATED COUNT: 12.8 % (ref 11.5–14.5)
EST. GFR  (AFRICAN AMERICAN): >60 ML/MIN/1.73 M^2
EST. GFR  (NON AFRICAN AMERICAN): 57 ML/MIN/1.73 M^2
FOLATE SERPL-MCNC: 10.3 NG/ML (ref 4–24)
GLUCOSE SERPL-MCNC: 134 MG/DL (ref 70–110)
HCT VFR BLD AUTO: 28.5 % (ref 40–54)
HGB BLD-MCNC: 9 G/DL (ref 14–18)
IMM GRANULOCYTES # BLD AUTO: 0.02 K/UL (ref 0–0.04)
IMM GRANULOCYTES NFR BLD AUTO: 0.2 % (ref 0–0.5)
LYMPHOCYTES # BLD AUTO: 2.2 K/UL (ref 1–4.8)
LYMPHOCYTES NFR BLD: 25.7 % (ref 18–48)
MCH RBC QN AUTO: 27.9 PG (ref 27–31)
MCHC RBC AUTO-ENTMCNC: 31.6 G/DL (ref 32–36)
MCV RBC AUTO: 88 FL (ref 82–98)
MONOCYTES # BLD AUTO: 0.8 K/UL (ref 0.3–1)
MONOCYTES NFR BLD: 8.7 % (ref 4–15)
NEUTROPHILS # BLD AUTO: 5.4 K/UL (ref 1.8–7.7)
NEUTROPHILS NFR BLD: 62.8 % (ref 38–73)
NRBC BLD-RTO: 0 /100 WBC
PLATELET # BLD AUTO: 336 K/UL (ref 150–450)
PMV BLD AUTO: 10.8 FL (ref 9.2–12.9)
POCT GLUCOSE: 114 MG/DL (ref 70–110)
POCT GLUCOSE: 91 MG/DL (ref 70–110)
POTASSIUM SERPL-SCNC: 3.3 MMOL/L (ref 3.5–5.1)
RBC # BLD AUTO: 3.23 M/UL (ref 4.6–6.2)
SODIUM SERPL-SCNC: 140 MMOL/L (ref 136–145)
VIT B12 SERPL-MCNC: 330 PG/ML (ref 210–950)
WBC # BLD AUTO: 8.67 K/UL (ref 3.9–12.7)

## 2022-02-20 PROCEDURE — 25000003 PHARM REV CODE 250: Performed by: NURSE PRACTITIONER

## 2022-02-20 PROCEDURE — 87075 CULTR BACTERIA EXCEPT BLOOD: CPT | Mod: 59 | Performed by: HOSPITALIST

## 2022-02-20 PROCEDURE — G0378 HOSPITAL OBSERVATION PER HR: HCPCS

## 2022-02-20 PROCEDURE — 96366 THER/PROPH/DIAG IV INF ADDON: CPT

## 2022-02-20 PROCEDURE — 97162 PT EVAL MOD COMPLEX 30 MIN: CPT

## 2022-02-20 PROCEDURE — 80048 BASIC METABOLIC PNL TOTAL CA: CPT | Performed by: HOSPITALIST

## 2022-02-20 PROCEDURE — 87070 CULTURE OTHR SPECIMN AEROBIC: CPT | Mod: 59 | Performed by: HOSPITALIST

## 2022-02-20 PROCEDURE — 96376 TX/PRO/DX INJ SAME DRUG ADON: CPT

## 2022-02-20 PROCEDURE — 92610 EVALUATE SWALLOWING FUNCTION: CPT

## 2022-02-20 PROCEDURE — 63600175 PHARM REV CODE 636 W HCPCS: Performed by: EMERGENCY MEDICINE

## 2022-02-20 PROCEDURE — 63600175 PHARM REV CODE 636 W HCPCS: Performed by: HOSPITALIST

## 2022-02-20 PROCEDURE — 87147 CULTURE TYPE IMMUNOLOGIC: CPT | Mod: 59 | Performed by: HOSPITALIST

## 2022-02-20 PROCEDURE — 97535 SELF CARE MNGMENT TRAINING: CPT

## 2022-02-20 PROCEDURE — 63600175 PHARM REV CODE 636 W HCPCS: Performed by: NURSE PRACTITIONER

## 2022-02-20 PROCEDURE — 87070 CULTURE OTHR SPECIMN AEROBIC: CPT | Performed by: HOSPITALIST

## 2022-02-20 PROCEDURE — 25000003 PHARM REV CODE 250: Performed by: HOSPITALIST

## 2022-02-20 PROCEDURE — 36415 COLL VENOUS BLD VENIPUNCTURE: CPT | Performed by: HOSPITALIST

## 2022-02-20 PROCEDURE — 97165 OT EVAL LOW COMPLEX 30 MIN: CPT

## 2022-02-20 PROCEDURE — 85025 COMPLETE CBC W/AUTO DIFF WBC: CPT | Performed by: HOSPITALIST

## 2022-02-20 PROCEDURE — 92523 SPEECH SOUND LANG COMPREHEN: CPT

## 2022-02-20 PROCEDURE — 25000003 PHARM REV CODE 250: Performed by: EMERGENCY MEDICINE

## 2022-02-20 PROCEDURE — 87075 CULTR BACTERIA EXCEPT BLOOD: CPT | Performed by: HOSPITALIST

## 2022-02-20 RX ORDER — POTASSIUM CHLORIDE 750 MG/1
30 TABLET, EXTENDED RELEASE ORAL ONCE
Status: COMPLETED | OUTPATIENT
Start: 2022-02-20 | End: 2022-02-20

## 2022-02-20 RX ORDER — FERROUS SULFATE 325(65) MG
325 TABLET, DELAYED RELEASE (ENTERIC COATED) ORAL DAILY
Qty: 30 TABLET | Refills: 1 | Status: SHIPPED | OUTPATIENT
Start: 2022-02-20

## 2022-02-20 RX ORDER — METOPROLOL TARTRATE 25 MG/1
25 TABLET, FILM COATED ORAL 2 TIMES DAILY
Qty: 60 TABLET | Refills: 1 | Status: SHIPPED | OUTPATIENT
Start: 2022-02-20 | End: 2023-02-20

## 2022-02-20 RX ORDER — LISINOPRIL 20 MG/1
20 TABLET ORAL DAILY
Qty: 30 TABLET | Refills: 1 | Status: SHIPPED | OUTPATIENT
Start: 2022-02-20 | End: 2023-02-20

## 2022-02-20 RX ORDER — INSULIN ASPART 100 [IU]/ML
INJECTION, SOLUTION INTRAVENOUS; SUBCUTANEOUS
Qty: 3 ML | Refills: 2 | Status: SHIPPED | OUTPATIENT
Start: 2022-02-20

## 2022-02-20 RX ORDER — CEPHALEXIN 500 MG/1
500 CAPSULE ORAL EVERY 8 HOURS
Qty: 21 CAPSULE | Refills: 0 | Status: SHIPPED | OUTPATIENT
Start: 2022-02-20 | End: 2022-02-27

## 2022-02-20 RX ADMIN — DOCUSATE SODIUM 100 MG: 100 CAPSULE ORAL at 10:02

## 2022-02-20 RX ADMIN — METOPROLOL TARTRATE 25 MG: 25 TABLET, FILM COATED ORAL at 10:02

## 2022-02-20 RX ADMIN — FERROUS SULFATE TAB 325 MG (65 MG ELEMENTAL FE) 1 EACH: 325 (65 FE) TAB at 10:02

## 2022-02-20 RX ADMIN — FUROSEMIDE 40 MG: 10 INJECTION, SOLUTION INTRAMUSCULAR; INTRAVENOUS at 02:02

## 2022-02-20 RX ADMIN — PIPERACILLIN AND TAZOBACTAM 4.5 G: 4; .5 INJECTION, POWDER, LYOPHILIZED, FOR SOLUTION INTRAVENOUS; PARENTERAL at 12:02

## 2022-02-20 RX ADMIN — PIPERACILLIN AND TAZOBACTAM 4.5 G: 4; .5 INJECTION, POWDER, LYOPHILIZED, FOR SOLUTION INTRAVENOUS; PARENTERAL at 10:02

## 2022-02-20 RX ADMIN — POTASSIUM CHLORIDE 30 MEQ: 750 TABLET, EXTENDED RELEASE ORAL at 10:02

## 2022-02-20 RX ADMIN — LISINOPRIL 20 MG: 20 TABLET ORAL at 10:02

## 2022-02-20 NOTE — PROGRESS NOTES
[facility] delivered rolling walker and bedside commode directly to patient room. Patient signed for HME equipment.

## 2022-02-20 NOTE — NURSING
Received call from wife Jesica Richards, states her brother Joe is coming from [facility] to get patient today.

## 2022-02-20 NOTE — PROGRESS NOTES
[facility] to [facility] for HH to be arranged. TN indicated that the pt is to d/c to home on today

## 2022-02-20 NOTE — PROGRESS NOTES
Call placed to pts spouse Jesica to discuss d/c plans for pt. TN confirmed address for pt and was told that he will be returning to [place] and address is [address] [place] [address]. TN advised that the pt received a rolling walker and BSC.Spouse asked if pt was ready for d/c TN advised he is ready for discharge. Spouse to call to have someone to pick the pt up.

## 2022-02-20 NOTE — DISCHARGE INSTRUCTIONS
Take all medications as prescribed  Eat a strict low salt cardiac and diabetic diet  Follow up with your primary care provider within 1 week.  Also, referral to Outpatient Wound Care clinic to help manage his wounds was placed - they will call you to set up appointment.  Referral to vascular surgery to assess the blood flow in Mr. Richards's legs was placed - they will call you to set up appointment.  Home health for Physical and occupational therapy, a skilled nurse and an aid is being ordered.    Thank you for trusting [facility] and Dr. Arreguin with your care.  We are honored that you entrusted us with your healthcare needs. Your satisfaction is very important to us and we hope you have been very pleased with your experience at [facility]. After your discharge you may receive a survey asking you to rate your hospital experience. We encourage you to take the time to complete the survey as your feedback allows us to identify areas for improvement as well as recognize our staff.   Your satisfaction is our top priority.

## 2022-02-20 NOTE — PLAN OF CARE
Problem: Physical Therapy Goal  Goal: Physical Therapy Goal  Outcome: Ongoing, Progressing   Goals to be met by: 3/20/2022     Patient will increase functional independence with mobility by performin. Supine to sit with Modified [place]  2. Sit to stand transfer with Supervision  3. Gait  x 50 feet with Minimal Assistance using Rolling Walker.   4. Lower extremity exercise program x30 reps per handout, with supervision

## 2022-02-20 NOTE — PT/OT/SLP EVAL
Occupational Therapy   Evaluation    Name: Philomena Richards  MRN: [id number]  Admitting Diagnosis:  Wounds, multiple  Recent Surgery: * No surgery found *      Recommendations:     Discharge Recommendations:  (OT at next lelvel of care; per chart, pt will d/c back to [place] and will not recevie HHPT/OT services; pt will benefit from 24 hr care/supervision)  Discharge Equipment Recommendations:  walker, rolling, bedside commode (delivered to bedside during eval)  Barriers to discharge:   (Pt is at risk for falls, readmission and morbidity.)    Assessment:     Philomena Richards is a 66 y.o. male with a medical diagnosis of Wounds, multiple.  Performance deficits affecting function: weakness, impaired endurance, impaired self care skills, impaired functional mobilty, gait instability, impaired balance, impaired cognition, decreased coordination, decreased upper extremity function, decreased lower extremity function, decreased safety awareness, impaired skin, edema.      Pt pleasant and willing to participate in tx session this date; pt appeared mildly confused and w/ delayed response but was able to perform bed mobility and stand w/ CGA and use of RW to transfer to chair w/ min A. Pt required cueing for safety purposes but otherwise, tolerated activities well and will continue to benefit from skilled acute OT services to maximize functional capacity for safe performance w/ ADLs and functional mobility.     Rehab Prognosis: Good; patient would benefit from acute skilled OT services to address these deficits and reach maximum level of function.       Plan:     Patient to be seen 5 x/week to address the above listed problems via self-care/home management, therapeutic activities, therapeutic exercises  · Plan of Care Expires: 03/06/22  · Plan of Care Reviewed with: patient (joaquin)    Subjective     Chief Complaint: BLE pain w/ mobility   Patient/Family Comments/goals: Pt willing to participate.     Occupational Profile: Pt  "somewhat of a poor historian; information obtained from niece who was at bedside.   Living Environment: Pt lives w/ wife in [facility] w/ 0 DANY.   Previous level of function: Pt was ambulatory w/ use of SPC.   Roles and Routines: None stated   Equipment Used at Home:  cane, straight  Assistance upon Discharge: Pt weil have assistance from niece? Per chart, pt's wife is "ill" and unable to care for pt.     Pain/Comfort:  · Pain Rating 1: 0/10  · Pain Rating Post-Intervention 1: 0/10    Patients cultural, spiritual, Mu-ism conflicts given the current situation: no    Objective:     Communicated with: nurse prior to session.  Patient found HOB elevated with bed alarm, peripheral IV, Condom Catheter, BLE pressure relief boots (Avasys) upon OT entry to room.    General Precautions: Standard, fall   Orthopedic Precautions:N/A   Braces: N/A  Respiratory Status: Room air    Occupational Performance:    Bed Mobility:    · Patient completed Scooting hips to EOB with stand by assistance   · Patient completed Supine to Sit with minimum assistance and HOB elevated     Functional Mobility/Transfers:  · Patient completed Sit <> Stand Transfer with contact guard assistance  with  rolling walker   · Patient completed Bed <> Chair Transfer using Step Transfer technique with contact guard assistance and minimum assistance with rolling walker    Activities of Daily Living:  · Upper Body Dressing: minimum assistance for donning gown over back  · Lower Body Dressing: dependence for donning socks for BLEs    Cognitive/Visual Perceptual:  Cognitive/Psychosocial Skills:     -       Oriented to: Person and Place   -       Follows Commands/attention:Follows two-step commands but noted w/ decreased focused attention   -       Communication: Delayed speech  -       Memory: Impaired STM and Poor immediate recall  -       Safety awareness/insight to disability: impaired     Physical Exam:  Balance:    -       good sitting balance; fair + standing " balance  Postural examination/scapula alignment:    -       Forward head  Skin integrity: Visible skin intact and dressing to BLEs intact   Edema:  Moderate BLEs  Sensation:    -       Intact  Upper Extremity Range of Motion:     -       Right Upper Extremity: WFL  -       Left Upper Extremity: WFL  Upper Extremity Strength:    -       Right Upper Extremity: WFL  -       Left Upper Extremity: WFL   Strength:    -       Right Upper Extremity: WFL  -       Left Upper Extremity: WFL    AMPAC 6 Click ADL:  AMPAC Total Score: 21    Treatment & Education:  -Initial eval complete.   -Pt educated on role of OT and POC.   -Pt educated on safe functional mobility and ADL performance.   -Niece at side providing information; per family, plan is to d/c to [place]. During eval, BSC and RW were delivered to room.   -Questions and concerns addressed within OT scope.   Education:    Patient left up in chair with all lines intact, call button in reach, chair alarm on, BLEs elevated w/ pressure relief boot and Avasys present     GOALS:   Multidisciplinary Problems     Occupational Therapy Goals        Problem: Occupational Therapy Goal    Goal Priority Disciplines Outcome Interventions   Occupational Therapy Goal     OT, PT/OT Ongoing, Progressing    Description: Goals to be met by: 3/6/22     Patient will increase functional independence with ADLs by performing:    LE Dressing with Modified [place].  Grooming while standing at sink with Modified [place].  Toileting from toilet with Modified [place] for hygiene and clothing management.   Supine to sit with Modified [place].  Step transfer with Modified [place]  Toilet transfer to toilet with Modified [place].  Upper extremity exercise program x15 reps per handout, with independence.                     History:     No past medical history on file.    No past surgical history on file.    Time Tracking:     OT Date of Treatment: 02/20/22  OT Start  Time: 1040  OT Stop Time: 1103  OT Total Time (min): 23 min    Billable Minutes:Evaluation 15  Total Time 23 (co-tx w/ PT)     2/20/2022

## 2022-02-20 NOTE — PLAN OF CARE
[facility]      HOME HEALTH ORDERS  FACE TO FACE ENCOUNTER    Patient Name: Philomena Richards  YOB: 1955    PCP: Primary Doctor No   PCP Address: None  PCP Phone Number: None  PCP Fax: None    Encounter Date: 2/18/22    Admit to Home Health    Diagnoses:  Active Hospital Problems    Diagnosis  POA    *Wounds, multiple [T07.XXXA]  Yes     Priority: 1 - High    Edema of both lower extremities [R60.0]  Yes     Priority: 2     Fever [R50.9]  Yes     Priority: 4     Encephalopathy, metabolic [G93.41]  Yes     Priority: 5     History of stroke [Z86.73]  Not Applicable     Priority: 6     Debility [R53.81]  Yes     Priority: 7     Anemia [D64.9]  Yes    HTN (hypertension) [I10]  Yes    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       Follow Up Appointments:  No future appointments.    Allergies:Review of patient's allergies indicates:  No Known Allergies    Medications: Review discharge medications with patient and family and provide education.    Current Discharge Medication List      START taking these medications    Details   cephALEXin (KEFLEX) 500 MG capsule Take 1 capsule (500 mg total) by mouth every 8 (eight) hours. for 7 days  Qty: 21 capsule, Refills: 0      ferrous sulfate 325 (65 FE) MG EC tablet Take 1 tablet (325 mg total) by mouth once daily.  Qty: 30 tablet, Refills: 1      insulin aspart U-100 (NOVOLOG) 100 unit/mL (3 mL) InPn pen 151-200 0 unit 0 unit 201-250 2 units 1 unit 251-300 3 units 1 unit 301-350 4 units 2 units >350 5 units 3 units Administer sold534-060 0 unit 0 unit 201-250 2 units 1 unit 251-300 3 units 1 unit 301-350 4 units 2 units >350 5 units 3 units Administer subcutaneously if needed before each meal.  Qty: 3 mL, Refills: 2    Comments: Please call Dr. Arreguin at [phone] if any issues with this Rx.         CONTINUE these medications which have CHANGED    Details    lisinopriL (PRINIVIL,ZESTRIL) 20 MG tablet Take 1 tablet (20 mg total) by mouth once daily.  Qty: 30 tablet, Refills: 1    Comments: .      metoprolol tartrate (LOPRESSOR) 25 MG tablet Take 1 tablet (25 mg total) by mouth 2 (two) times daily.  Qty: 60 tablet, Refills: 1    Comments: .         CONTINUE these medications which have NOT CHANGED    Details   furosemide (LASIX) 40 MG tablet Take 40 mg by mouth 2 (two) times daily.      potassium chloride SA (K-DUR,KLOR-CON) 20 MEQ tablet Take 20 mEq by mouth once.         STOP taking these medications       amLODIPine (NORVASC) 10 MG tablet Comments:   Reason for Stopping:         hydroCHLOROthiazide (HYDRODIURIL) 25 MG tablet Comments:   Reason for Stopping:                 I have seen and examined this patient within the last 30 days. My clinical findings that support the need for the home health skilled services and home bound status are the following:no   Weakness/numbness causing balance and gait disturbance due to Weakness/Debility making it taxing to leave home.  Requiring assistive device to leave home due to unsteady gait caused by  Weakness/Debility.     Diet:   cardiac diet and diabetic diet 1800 calorie    Referrals/ Consults  Physical Therapy to evaluate and treat. Evaluate for home safety and equipment needs; Establish/upgrade home exercise program. Perform / instruct on therapeutic exercises, gait training, transfer training, and Range of Motion.  Occupational Therapy to evaluate and treat. Evaluate home environment for safety and equipment needs. Perform/Instruct on transfers, ADL training, ROM, and therapeutic exercises.  Speech Therapy  to evaluate and treat for  Language, Swallowing and Cognition.  Aide to provide assistance with personal care, ADLs, and vital signs.    Activities:   ambulate in house with assistance    Nursing:   Agency to admit patient within 24 hours of hospital discharge unless specified on physician order or at patient  request    SN to complete comprehensive assessment including routine vital signs. Instruct on disease process and s/s of complications to report to MD. Review/verify medication list sent home with the patient at time of discharge  and instruct patient/caregiver as needed. Frequency may be adjusted depending on start of care date.     Skilled nurse to perform up to 3 visits PRN for symptoms related to diagnosis    Notify MD if SBP > 160 or < 90; DBP > 90 or < 50; HR > 120 or < 50; Temp > 101; O2 < 88%; Other:       Ok to schedule additional visits based on staff availability and patient request on consecutive days within the home health episode.    When multiple disciplines ordered:    Start of Care occurs on Sunday - Wednesday schedule remaining discipline evaluations as ordered on separate consecutive days following the start of care.    Thursday SOC -schedule subsequent evaluations Friday and Monday the following week.     Friday - Saturday SOC - schedule subsequent discipline evaluations on consecutive days starting Monday of the following week.    For all post-discharge communication and subsequent orders please contact patient's primary care physician.     Miscellaneous   Routine Skin for Bedridden Patients: Instruct patient/caregiver to apply moisture barrier cream to all skin folds and wet areas in perineal area daily and after baths and all bowel movements.  Diabetic Care:   SN to perform and educate Diabetic management with blood glucose monitoring:, Fingerstick blood sugar AC and HS and Report CBG < 60 or > 350 to physician.    Home Health Aide:  Nursing Three times weekly, Physical Therapy Three times weekly, Occupational Therapy Three times weekly, Speech Language Pathology Three times weekly, Medical Social Work Weekly and Home Health Aide Three times weekly    Wound Care Orders  yes:  Pressure Ulcer(s) Stage II :   Location: legs  Apply Miconzazole:  Barrier ointment                       Frequency:   Daily                             If incontinent of stool or urine, apply thin layer Barrier cream                   twice daily and PRN to wound         Pressure relief measure:  for pressure redistribution and A/C Boots              I certify that this patient is confined to his home and needs intermittent skilled nursing care, physical therapy, speech therapy and occupational therapy.

## 2022-02-20 NOTE — PLAN OF CARE
Problem: Occupational Therapy Goal  Goal: Occupational Therapy Goal  Description: Goals to be met by: 3/6/22     Patient will increase functional independence with ADLs by performing:    LE Dressing with Modified [place].  Grooming while standing at sink with Modified [place].  Toileting from toilet with Modified [place] for hygiene and clothing management.   Supine to sit with Modified [place].  Step transfer with Modified [place]  Toilet transfer to toilet with Modified [place].  Upper extremity exercise program x15 reps per handout, with independence.    2/20/2022 1227 by Makayla Peterson OT  Outcome: Ongoing, Progressing

## 2022-02-20 NOTE — PT/OT/SLP EVAL
Physical Therapy Evaluation    Patient Name:  Philomena Richards   MRN:  [id number]    Recommendations:     Discharge Recommendations:  home health PT   Discharge Equipment Recommendations: walker, rolling   Barriers to discharge: varied reports about home support system    Assessment:     Philomena Richards is a 66 y.o. male admitted with a medical diagnosis of Wounds, multiple.  He presents with the following impairments/functional limitations:  weakness, impaired endurance, impaired functional mobilty, gait instability, impaired balance, decreased lower extremity function, pain, decreased ROM Mr Richards was able to take 5-6 steps to the chair today but presented with significant weakness in the legs and had challenges with maintaining an upright posture and fatigued very quickly. He was able to sit in the chair with good control but had slightly more of a challenge rising up from a lower chair position. He will benefit from HHPT upon discharge, as he was able to get in and out of bed and mobilize with good control but is very fatigued and has difficulty with hip mobility. .    Rehab Prognosis: Fair; patient would benefit from acute skilled PT services to address these deficits and reach maximum level of function.    Recent Surgery: * No surgery found *      Plan:     During this hospitalization, patient to be seen 4 x/week to address the identified rehab impairments via gait training, therapeutic activities, therapeutic exercises, neuromuscular re-education and progress toward the following goals:    · Plan of Care Expires:  03/20/22    Subjective     Chief Complaint: legs hurt  Patient/Family Comments/goals: get back home  Pain/Comfort:  · Pain Rating 1:  (no pain level reported - just reported his legs hurt)  · Location 1: calf  · Pain Addressed 1: Nurse notified    Patients cultural, spiritual, Restorationist conflicts given the current situation: no    Living Environment:  Lives with wife in 1 level home with 4 steps to enter  with 1 rail  Prior to admission, patients level of function was dependant over the past 2 years on family and others to help.  Equipment used at home: cane, straight.  DME owned (not currently used): none.  Upon discharge, patient will have assistance from wife and family.    Objective:     Communicated with NSG prior to session.  Patient found supine with bed alarm, Condom Catheter, peripheral IV, telemetry  upon PT entry to room.    General Precautions: Standard, fall   Orthopedic Precautions:N/A   Braces: N/A  Respiratory Status: Room air    Exams:  · Sensation: -       Impaired  unable to formally assess secondary to bandages present  · RLE ROM: Deficits: decreased hip flexion and knee extension  · RLE Strength: WFL except decreased hip flexion strength (4-/5), ankle DF not tested due to bandages  · LLE ROM: Deficits: decreased hip flexion compared to right  · LLE Strength: WFL except ankle weakness present    Functional Mobility:  · Bed Mobility:  Supine to Sit: contact guard assistance  · Sit to Supine: contact guard assistance  · Transfers:  Sit to Stand:  minimum assistance and increased assist needed from lower chair than bed with rolling walker  · Gait: amb 5-6 steps in the room with slow steps and short step length bilaterally. He was able to maintain position in the walker with turning around and sat with good control with UE reaching back for the chair.    Therapeutic Activities and Exercises:   discussed ankle pumps and knee extension when In a seated position    AM-PAC 6 CLICK MOBILITY  Total Score:14     Patient left up in chair with all lines intact, chair alarm on, NSG notified and pressure relief boots donned.    GOALS:   Multidisciplinary Problems     Physical Therapy Goals        Problem: Physical Therapy Goal    Goal Priority Disciplines Outcome Goal Variances Interventions   Physical Therapy Goal     PT, PT/OT Ongoing, Progressing                   Goals to be met by: 3/20/2022     Patient  will increase functional independence with mobility by performin. Supine to sit with Modified [place]  2. Sit to stand transfer with Supervision  3. Gait  x 50 feet with Minimal Assistance using Rolling Walker.   4. Lower extremity exercise program x30 reps per handout, with supervision      History:     No past medical history on file.    No past surgical history on file.    Time Tracking:     PT Received On: 22  PT Start Time: 1040     PT Stop Time: 1104  PT Total Time (min): 24 min     Billable Minutes: Evaluation 1 Mod      2022

## 2022-02-20 NOTE — PROGRESS NOTES
Message sent to Ivet with [facility] to advise that the pt will be discharging to home in [place]  And to cancel home health

## 2022-02-20 NOTE — PT/OT/SLP EVAL
Speech Language Pathology Evaluation  Cognitive/Bedside Swallow    Patient Name:  Philomena Richards   MRN:  [id number]  Admitting Diagnosis: Wounds, multiple    Recommendations:                  General Recommendations:  Modified barium swallow study (outpatient)  Diet recommendations:  Mechanical soft, Thin   Aspiration Precautions: 1 bite/sip at a time and HOB to 90 degrees   General Precautions: Standard,    Communication strategies:  yes/no questions only and provide increased time to answer    History:     CVA  CT: 2/20/22: Multiple cerebral infarcts, including remote left frontal infarct.    Social History: Patient lives with wife    Modified Barium Swallow: Wife reporting Pt had one years ago but did not recall the results.    Chest X-Rays: 2/18/22   No acute intrathoracic abnormality detected.       Prior diet: unrestricted    Subjective   Pt seen upright in chair, agreeable to ST eval. Pleasant but easily distracted throughout. Of note, Pt reporting he lives alone however that is not the case, Pt lives with his wife Charlotte who was contacted regarding ST results and recs.   Patient goals: d/c     Pain/Comfort:  · Pain Rating 1: 0/10    Respiratory Status: Room air    Objective:     Cognitive Status:    Attention Sustained attention deficit mod-severe  Orientation Person and Place  Memory Immediate Recall Pt recalled up toe 5 digits and 4 words and Delayed0/3 single word targets after 2 minute delay      Receptive Language:   Comprehension:      Questions Simple yes/no 100%  Complex yes/no less than 60%  Commands  two step basic commands 100%  multistep basic commands arleth 66% acc.    Pragmatics:    inconsistent eye contact ., initiation poor and topic maintenance decreased    Expressive Language:  Verbal:    Automatic Speech  Counting 100%  Naming Confrontation 100% given multiple repetitions, Divergent responsive unable to perform and Single word responsive naming unable to perform        Motor  "Speech:  WFL    Voice:   wet/gurgly    Reading:   Wife reporting Pt does not read or write well.    Oral Musculature Evaluation  · Oral Musculature: general weakness  · Dentition: edentulous  · Secretion Management: adequate  · Mucosal Quality: good  · Mandibular Strength and Mobility: WFL, other (see comments) (underbite)  · Oral Labial Strength and Mobility: impaired coordination  · Lingual Strength and Mobility: impaired strength, other (see comments) (impaired coordination)  · Voice Prior to PO Intake: wet/gurgly  · Oral Musculature Comments: poor lingual coordination, wet vocal quality    Bedside Swallow Eval:   Consistencies Assessed:  · Thin liquids 30 ml via straw multipel trials  · Puree X5  · Solids X1     Oral Phase:   · prolonged mastication   · Tongue thrust  · Lingual dyskinesia     Pharyngeal Phase:   · multiple spontaneous swallows  · throat clearing (across trials)  · wet vocal quality after swallow (inconsistent)    Compensatory Strategies  · None    Treatment: ST spoke with MD, care management and Pt's wife regarding findings and recommendation for outpatient modified barium swallow.     Handout attached to chart    ST reported Pt's cognitive linguistic performance and wife confirmed this is Pt's baseline level of function.     Assessment:     Philomena Richards is a 66 y.o. male with dx of fever and "wounds" he presents with oropharyngeal dysphagia of a yet to be determined severity, and moderate-severe cognitive linguistic deficits at reported baseline level of function. HH has been arranged his anticipated discharge is today.     Goals:   Multidisciplinary Problems     SLP Goals        Problem: SLP Goal    Goal Priority Disciplines Outcome   SLP Goal    Low SLP    Description: STG  Pt will participate in ongoing assessment of swallow.  Pt will tolerate mech soft with thin liquids without overt s/s of aspiration.                   Plan:     · Patient to be seen:  1 x/week   · Plan of Care expires:   " 2/20  · Plan of Care reviewed with:  patient, spouse   · SLP Follow-Up:  Yes       Discharge recommendations:  Discharge Facility/Level of Care Needs: other (see comments) (outpatient MBS)   Barriers to Discharge:  None    Time Tracking:     SLP Treatment Date:   02/20/22  Speech Start Time:  1140  Speech Stop Time:  1230     Speech Total Time (min):  50 min    Billable Minutes: Eval 20 , Eval Swallow and Oral Function 15 and Self Care/Home Management Training 15    02/20/2022

## 2022-02-20 NOTE — PROGRESS NOTES
Accepted by [facility]. Contact information entered into follow up tab to print on AVS at time of discharge

## 2022-02-20 NOTE — PROGRESS NOTES
WRITTEN HEALTHCARE DISCHARGE INFORMATION      Things that YOU are RESPONSIBLE for to Manage Your Care At Home:     1. Getting your prescriptions filled.  2. Taking you medications as directed. DO NOT MISS ANY DOSES!  3. Going to your follow-up doctor appointments. This is important because it allows the doctor to monitor your progress and to determine if any changes need to be made to your treatment plan.     If you are unable to make your follow up appointments, please call the number listed and reschedule this appointment.      ____________HELP AT HOME____________________     Experiencing any SIGNS or SYMPTOMS: YOU CAN     Schedule a same day appopintment with your Primary Care Doctor or  you can call [facility] On Call Nurse Care Line for 24/7 assistance at [phone]     If you are experience any signs or symptoms that have become severe, Call 911 and come to your nearest Emergency Room.     Thank you for choosing [facility] and allowing us to care for you.   From your care management team:      You should receive a call from [facility] Discharge Department within 48-72 hours to help manage your care after discharge. Please try to make sure that you answer your phone for this important phone call.     Follow-up Information     [facility] Follow up.    Why: DME- provider of rolling walker and bedside commode  Contact information:  [address] [place] [address] [phone]

## 2022-02-21 LAB
HAV IGM SERPL QL IA: NEGATIVE
HBV CORE IGM SERPL QL IA: NEGATIVE
HBV SURFACE AG SERPL QL IA: NEGATIVE
HCV AB SERPL QL IA: NEGATIVE
RPR SER QL: NORMAL

## 2022-02-21 NOTE — NURSING
Discharge information given to patient wife Jesica via phone conversation. Explained out patient information needed.   Patient transported to personal vehicle via nursing staff and security. All personal belongings with patient at the time of discharge.  1949: called and left voicemail with patient wife to confirm patient has left with Mr. Cristobal and to get an updated pharmacy from her. Told to call back and speak with night charge nurse VIDAL Da Silva.

## 2022-02-21 NOTE — DISCHARGE SUMMARY
[facility] Medicine  Discharge Summary      Patient Name: Philomena Richards  MRN: [id number]  Patient Class: OP- Observation  Admission Date: 2/18/2022  Hospital Length of Stay: 0 days  Discharge Date and Time: No discharge date for patient encounter.  Attending Physician: Digna Arreguin MD   Discharging Provider: Digna Arreguin MD  Primary Care Provider: Primary Doctor No      HPI:   66 year old male brought to the ED due to sores on bilateral legs. Patient has no medical history per family. Family brings patient in stating that he stays with their family member in [facility] however because he has [place] insurance he had to come back here to get medical care.  Per patient's family member he has had sores on his legs for unknown period of time, and presents here for evaluation.        Goals of Care Treatment Preferences:  Code Status: Full Code      Consults:   Consults (From admission, onward)        Status Ordering Provider     Inpatient consult to Social Work  Once        Provider:  (Not yet assigned)    Completed DIGNA ARREGUIN     Inpatient consult to Social Work  Once        Provider:  (Not yet assigned)    Completed DIGNA ARREGUIN     Inpatient consult to Social Work  Once        Provider:  (Not yet assigned)    Completed DIGNA ARREGUIN        Hospital Course By Problem:   * Wounds, multiple  -Placed in observation  -Noted multiple wounds on legs which niece states have developed over last 4-5 weeks  -Had one isolated fever but no leukocytosis.  -Blood cultures obtained in ER negative   -procalcitonin and lactic acid negative  -CXR clear.  UA clear  -Treated with IV antibiotics during his stay.  Will give short course of keflex.  -Placed referral to wound care clinic.  -Ordered HH with SN to monitor wounds at home.  -Patient to d/c home in [place] today.  Follow up with pcp.    Edema of both lower extremities  -Has developed edema in legs over last 4-5 weeks per niece  -BNP only 26 on  admit.  Albumin only 2.7  -Doppler US of legs negative for DVT  -Echo showed EF 65%, normal diastolic function and normal pulmonary artery pressure  -No evidence of nephrotic syndrome.  Unable to do arterial US w TRICIA as it is Sunday.  -Will not resume home norvasc.    -Placed ambulatory referral to vascular surgery for evaluation of possible PAD.  -Continue home lasix at discharge    Encephalopathy, metabolic  -Family states he is at baseline, but what they describe as baseline is much better than his present condition.  -Barely alert and minimally verbal on 2/19.  Much better on 2/20, but still very weak.  -TSH, UA and CXR normal.  -Head CT showed no large acute territorial infarct and multiple cerebral infarcts, including remote left frontal infarct  -Searched for other reversible encephalopathies:  Urine drug screen negative.  TSH normal.  B12 normal.  Ammonia normal  -Order neuro-checks and delirium precautions.  -Suspect family was right and this is his recent baseline.  Likely vascular dementia.    History of stroke  -Noted 2021 - request records from [facility]  -Family states he is at baseline - but what they describe as baseline is much better than his present condition.  -LDL 42 and TC 90  -Started aspirin  -Consulted PT/OT/SLP  -Changed diet to mechanical soft with thin liquids.  Needs outpatient MBBS  -Added PT, OT and SLP to his home health orders.    Debility  -Noted stroke in 2021   -Family states he is at baseline   -Consult PT/OT.    Type 2 diabetes mellitus with circulatory disorder  -A1c 6.4  -Niece states he takes detemir 15 units qhs  -Treated with SSI ac/hs during his stay  -Resume home regimen at discharge    HTN (hypertension)  -At home takes norvasc, metoprolol and lisinopril per family  -Will continue lisinopril and metoprolol but at lower doses.  -Not resuming norvasc due to peripheral edema.    Anemia  -On admit Hb 10.4.  No old labs to compare  -Today Hb stable at  "9.0  -He is iron deficient.  Folate and B12 are replete  -Started FeSo4.      Final Active Diagnoses:    Diagnosis Date Noted POA    PRINCIPAL PROBLEM:  Wounds, multiple [T07.XXXA] 02/19/2022 Yes    Edema of both lower extremities [R60.0] 02/19/2022 Yes    Fever [R50.9] 02/19/2022 Yes    Encephalopathy, metabolic [G93.41] 02/19/2022 Yes    History of stroke [Z86.73] 02/19/2022 Not Applicable    Debility [R53.81] 02/19/2022 Yes    Anemia [D64.9] 02/19/2022 Yes    HTN (hypertension) [I10] 02/19/2022 Yes    Type 2 diabetes mellitus with circulatory disorder [E11.59] 02/19/2022 Yes      Problems Resolved During this Admission:       Discharged Condition: fair    Disposition: Home-Health Care Svc    Follow Up:   Follow-up Information     [facility] Follow up.    Why: DME- provider of rolling walker and bedside commode  Contact information:  [address] [place] [address] [phone]                     Patient Instructions:      WALKER FOR HOME USE     Order Specific Question Answer Comments   Type of Walker: Adult (5'4"-6'6")    With wheels? Yes    Height: 5' 7" (1.702 m)    Weight: 77.7 kg (171 lb 4.8 oz)    Length of need (1-99 months): 99    Does patient have medical equipment at home? canadrianna girard    Please check all that apply: Patient's condition impairs ambulation.    Please check all that apply: Patient is unable to safely ambulate without equipment.      COMMODE FOR HOME USE     Order Specific Question Answer Comments   Type: Standard    Height: 5' 7" (1.702 m)    Weight: 77.7 kg (171 lb 4.8 oz)    Does patient have medical equipment at home? cane, straight    Length of need (1-99 months): 99      Ambulatory referral/consult to Vascular Surgery   Standing Status: Future   Referral Priority: Routine Referral Type: Consultation   Referral Reason: Specialty Services Required   Requested Specialty: Vascular Surgery   Number of Visits Requested: 1     Ambulatory " referral/consult to Wound Clinic   Standing Status: Future   Referral Priority: Routine Referral Type: Consultation   Referral Reason: Specialty Services Required   Requested Specialty: Wound Care   Number of Visits Requested: 1     Diet Cardiac     Diet diabetic     Notify your health care provider if you experience any of the following:  increased confusion or weakness     Notify your health care provider if you experience any of the following:  persistent dizziness, light-headedness, or visual disturbances     Notify your health care provider if you experience any of the following:  worsening rash     Notify your health care provider if you experience any of the following:  severe persistent headache     Notify your health care provider if you experience any of the following:  difficulty breathing or increased cough     Notify your health care provider if you experience any of the following:  severe uncontrolled pain     Notify your health care provider if you experience any of the following:  persistent nausea and vomiting or diarrhea     Notify your health care provider if you experience any of the following:  temperature >100.4     Activity as tolerated       Significant Diagnostic Studies: Labs:   BMP:   Recent Labs   Lab 02/18/22 2201 02/19/22 0303 02/20/22 0347   * 153* 134*    137 140   K 4.1 3.5 3.3*    105 103   CO2 26 26 28   BUN 19 18 15   CREATININE 1.2 1.1 1.3   CALCIUM 8.9 8.4* 8.2*   , CMP   Recent Labs   Lab 02/18/22 2201 02/19/22 0303 02/20/22  0347    137 140   K 4.1 3.5 3.3*    105 103   CO2 26 26 28   * 153* 134*   BUN 19 18 15   CREATININE 1.2 1.1 1.3   CALCIUM 8.9 8.4* 8.2*   PROT 6.5  --   --    ALBUMIN 2.7*  --   --    BILITOT 0.4  --   --    ALKPHOS 136*  --   --    AST 13  --   --    ALT 18  --   --    ANIONGAP 6* 6* 9   ESTGFRAFRICA >60 >60 >60   EGFRNONAA >60 >60 57*   , CBC   Recent Labs   Lab 02/18/22 2201 02/19/22 0303 02/20/22 0347    WBC 9.43 11.07 8.67   HGB 10.4* 8.9* 9.0*   HCT 32.5* 28.2* 28.5*    324 336   , INR No results found for: INR, PROTIME, Lipid Panel   Lab Results   Component Value Date    CHOL 90 (L) 02/19/2022    HDL 36 (L) 02/19/2022    LDLCALC 42.6 (L) 02/19/2022    TRIG 57 02/19/2022    CHOLHDL 40.0 02/19/2022   , Troponin   Recent Labs   Lab 02/18/22  2201   TROPONINI <0.006    and A1C:   Recent Labs   Lab 02/19/22  0303   HGBA1C 6.4*       Pending Diagnostic Studies:     Procedure Component Value Units Date/Time    Hepatitis panel, acute [[id number]] Collected: 02/19/22 1518    Order Status: Sent Lab Status: In process Updated: 02/20/22 1433    Specimen: Blood     RPR [[id number]] Collected: 02/19/22 1518    Order Status: Sent Lab Status: In process Updated: 02/20/22 1433    Specimen: Blood     US Lower Extrem Arteries Bilat with TRICIA (xpd) [[id number]]     Order Status: Sent Lab Status: No result          Medications:  Reconciled Home Medications:      Medication List      START taking these medications    cephALEXin 500 MG capsule  Commonly known as: KEFLEX  Take 1 capsule (500 mg total) by mouth every 8 (eight) hours. for 7 days     ferrous sulfate 325 (65 FE) MG EC tablet  Take 1 tablet (325 mg total) by mouth once daily.     insulin aspart U-100 100 unit/mL (3 mL) Inpn pen  Commonly known as: NovoLOG  151-200 0 unit 0 unit 201-250 2 units 1 unit 251-300 3 units 1 unit 301-350 4 units 2 units >350 5 units 3 units Administer [id number] 0 unit 0 unit 201-250 2 units 1 unit 251-300 3 units 1 unit 301-350 4 units 2 units >350 5 units 3 units Administer subcutaneously if needed before each meal.        CHANGE how you take these medications    lisinopriL 20 MG tablet  Commonly known as: PRINIVIL,ZESTRIL  Take 1 tablet (20 mg total) by mouth once daily.  What changed:   · medication strength  · how much to take     metoprolol tartrate 25 MG tablet  Commonly known as: LOPRESSOR  Take 1 tablet (25 mg total) by mouth 2 (two)  times daily.  What changed:   · medication strength  · how much to take        CONTINUE taking these medications    furosemide 40 MG tablet  Commonly known as: LASIX  Take 40 mg by mouth 2 (two) times daily.     potassium chloride SA 20 MEQ tablet  Commonly known as: K-DUR,KLOR-CON  Take 20 mEq by mouth once.        STOP taking these medications    amLODIPine 10 MG tablet  Commonly known as: NORVASC     hydroCHLOROthiazide 25 MG tablet  Commonly known as: HYDRODIURIL            Indwelling Lines/Drains at time of discharge:   Lines/Drains/Airways     Drain  Duration           Male External Urinary Catheter 02/19/22 2000 Medium <1 day                Time spent on the discharge of patient: 35 minutes         Medhat Arreguin MD  Department of Hospital Medicine  [facility] [place] [facility]

## 2022-02-21 NOTE — ASSESSMENT & PLAN NOTE
-Noted 2021 - request records from [facility]  -Family states he is at baseline - but what they describe as baseline is much better than his present condition.  -LDL 42 and TC 90  -Start aspirin  -Consult PT/OT/SLP

## 2022-02-22 LAB
BACTERIA SPEC AEROBE CULT: ABNORMAL
BACTERIA SPEC AEROBE CULT: ABNORMAL

## 2022-02-23 LAB
BACTERIA BLD CULT: NORMAL
BACTERIA BLD CULT: NORMAL

## 2022-02-24 LAB
BACTERIA SPEC ANAEROBE CULT: NORMAL
BACTERIA SPEC ANAEROBE CULT: NORMAL